# Patient Record
Sex: FEMALE | Race: BLACK OR AFRICAN AMERICAN | Employment: OTHER | URBAN - METROPOLITAN AREA
[De-identification: names, ages, dates, MRNs, and addresses within clinical notes are randomized per-mention and may not be internally consistent; named-entity substitution may affect disease eponyms.]

---

## 2018-07-02 ENCOUNTER — CLINICAL SUPPORT (OUTPATIENT)
Dept: SURGERY | Age: 68
End: 2018-07-02

## 2018-07-02 VITALS — WEIGHT: 293 LBS | BODY MASS INDEX: 45.99 KG/M2 | HEIGHT: 67 IN

## 2018-07-02 DIAGNOSIS — E66.01 MORBID OBESITY WITH BMI OF 50.0-59.9, ADULT (HCC): Primary | ICD-10-CM

## 2018-07-02 NOTE — PROGRESS NOTES
Medical Weight Loss Multi-Disciplinary Program    Name: Sharonda Arora   : 1950    Session# 1  Date: 2018     Height: 5' 7\" (170.2 cm)    Weight: (!) 161.9 kg (357 lb) lbs. Body mass index is 55.91 kg/(m^2). Pounds Lost: 0 Pounds Gained: 0    Dietary Instructions    Reviewed intake  Understanding label reading  Understanding low carbohydrates, low sugar, higher protein meals  Understanding proper portions  Dining outside home  Instruction given for personal dietary changes  Discussed perceived compliance  Comments: Pt given brief pre/post-op diet ed and diet hx reviewed. Physical Activity/Exercise    Discussed Perceived Compliance  Reasonable Goals Set  Motivation  Comments: patient walks 30-35 minutes 3 days a week      Behavior Modification    Positive attitude  Comments: Pt is working on the following goals:  Goals:   1. Continue current exercise routine of walking 3 days a week for 30-35 minutes, adding the elliptical machine slowly as able  2. Start taking a daily MVI ([place] Complete Chewable) once a day before surgery and twice a day after surgery for life  3. Make sure you're consistently eating three meals a day - you can use a protein shake as a meal replacement or snack (refer to protein supplement guide in nutrition folder) - you can use a protein shake as one meal replacement and one snack - can drink up to 2 shakes a day    Candidate for surgery (per RD):pending     Dietitian: Marlene Hurst is a 75 y.o. female who present for a pre-op evaluation. Visit Vitals    Ht 5' 7\" (1.702 m)    Wt (!) 161.9 kg (357 lb)    BMI 55.91 kg/m2     No past medical history on file. Procedure:  laparoscopic gastric bypass surgery     Reasons for Surgery:  BMI > 40 with one or more medically significant comorbidities    Summary:  Pt given brief pre/post-op diet ed and diet hx reviewed.  Pt instructed to follow a low calorie, low carbohydrate, high protein diet of about 1313-7813 calories daily. Pt set several goals. See below. Current Vitamins: none     What have you done in the past to try to lose weight? TOPS, weight watchers, slenderize (in [place]), calorie-counting, cabbage soup diet, tuna fish diet     Why didn't you lose weight or keep the weight off?: patient was able to lose inches with slenderize, and some weight with weight watchers, but was not able to keep the weight off     Why do you think having weight loss surgery will make it possible for you to lose weight and keep it off? Patient is here today because she is a diabetic and for the last few years she was placed on insulin and wants to lose the weight in order to stop taking her insulin and to feel better mentally and physically and to prevent anymore diabetes-related issues       Patient Education and Materials Provided:  Supplement Resource Guide, B Vitamin Information, MVI Recommendations, Calcium Citrate Information, Bariatric Supplement Companies, Protein Supplement Information, Fluid Requirements, No Caffeine or Carbonation, No Alcohol for One Year Post Op, 3 Balanced Meals a Day, Food Group Guide, Good Choices Dining Out, No Snacks, No Concentrated Sweets, Support System at Home, Exercising and Support Group Information    Nutritional Hx: What is the number of meals you eat per day? Sometimes 2 and sometimes 3   Comment: her kitchen is downstairs and sometimes she doesn't eat due to working on other things around the house     Do you eat between meals / snack? no  Typical snack: patient stopped snacking throughout the day, but recently stopped     How fast do you eat your meals? slow    How often do you eat fast food? occasionally    How many sodas/sugared beverages do you drink per day? No soda, no sweet tea     How many caffeinated drinks do you have per day? Patient drinks 2 cups of coffee a day     How much milk and/or juice do you drink per day?  No milk, no juice     How much water do you drink per day? Patient drinks 2-3 24-ounce cup fulls of water a day     How often do you consume alcohol? never;     Diet History:  Breakfast  What are you eating and how much? Bran flakes with blueberries with a boiled egg    When? ii   Where? iii   Snacks  What are you eating and how much? None    When? ii   Where? iii   Hydration  What are you eating and how much? 2 cups of coffee and water   When? ii   Where? ii   Lunch  What are you eating and how much? Skipped    When? ii   Where? iii   Snacks  What are you eating and how much? None    When? ii   Where? iii   Hydration  What are you eating and how much? water   When? ii   Where? iii   Dinner  What are you eating and how much? 1/2 roast beef sandwich    When? ii   Where? iii   Snacks  What are you eating and how much? sherbet   When? ii   Where? iii   Hydration  What are you eating and how much? water   When? ii   Where? iii     Exercise:  Do you currently have an exercise routine? yes  What kind of exercise do you do? patient walks . For how long? 30-35 minutes For how often? 3 days a week    Goals:   1. Continue current exercise routine of walking 3 days a week for 30-35 minutes, adding the elliptical machine slowly as able  2. Start taking a daily MVI ([place] Complete Chewable) once a day before surgery and twice a day after surgery for life  3.  Make sure you're consistently eating three meals a day - you can use a protein shake as a meal replacement or snack (refer to protein supplement guide in nutrition folder) - you can use a protein shake as one meal replacement and one snack - can drink up to 2 shakes a day

## 2018-07-02 NOTE — PATIENT INSTRUCTIONS
Goals: 1. Continue current exercise routine of walking 3 days a week for 30-35 minutes, adding the elliptical machine slowly as able  2. Start taking a daily MVI ([place]'s Complete Chewable) once a day before surgery and twice a day after surgery for life  3.  Make sure you're consistently eating three meals a day - you can use a protein shake as a meal replacement or snack (refer to protein supplement guide in nutrition folder) - you can use a protein shake as one meal replacement and one snack - can drink up to 2 shakes a day

## 2018-07-03 RX ORDER — FAMOTIDINE 20 MG/1
20 TABLET, FILM COATED ORAL
COMMUNITY
Start: 2017-07-18 | End: 2018-07-06

## 2018-07-06 ENCOUNTER — OFFICE VISIT (OUTPATIENT)
Dept: SURGERY | Age: 68
End: 2018-07-06

## 2018-07-06 VITALS
SYSTOLIC BLOOD PRESSURE: 147 MMHG | WEIGHT: 293 LBS | OXYGEN SATURATION: 99 % | TEMPERATURE: 97.6 F | RESPIRATION RATE: 16 BRPM | DIASTOLIC BLOOD PRESSURE: 56 MMHG | HEART RATE: 80 BPM | BODY MASS INDEX: 45.99 KG/M2 | HEIGHT: 67 IN

## 2018-07-06 DIAGNOSIS — E66.01 MORBID OBESITY WITH BODY MASS INDEX (BMI) OF 50.0 TO 59.9 IN ADULT (HCC): ICD-10-CM

## 2018-07-06 DIAGNOSIS — I10 ESSENTIAL HYPERTENSION: ICD-10-CM

## 2018-07-06 DIAGNOSIS — Z79.4 CONTROLLED TYPE 2 DIABETES MELLITUS WITHOUT COMPLICATION, WITH LONG-TERM CURRENT USE OF INSULIN (HCC): ICD-10-CM

## 2018-07-06 DIAGNOSIS — E78.00 HYPERCHOLESTEROLEMIA: ICD-10-CM

## 2018-07-06 DIAGNOSIS — E11.9 CONTROLLED TYPE 2 DIABETES MELLITUS WITHOUT COMPLICATION, WITH LONG-TERM CURRENT USE OF INSULIN (HCC): ICD-10-CM

## 2018-07-06 DIAGNOSIS — E66.01 MORBID OBESITY DUE TO EXCESS CALORIES (HCC): Primary | ICD-10-CM

## 2018-07-06 DIAGNOSIS — M17.0 PRIMARY OSTEOARTHRITIS OF BOTH KNEES: ICD-10-CM

## 2018-07-06 RX ORDER — ATORVASTATIN CALCIUM 20 MG/1
TABLET, FILM COATED ORAL DAILY
COMMUNITY

## 2018-07-06 RX ORDER — CARVEDILOL 25 MG/1
25 TABLET ORAL 2 TIMES DAILY WITH MEALS
COMMUNITY

## 2018-07-06 RX ORDER — INSULIN GLARGINE 100 [IU]/ML
40 INJECTION, SOLUTION SUBCUTANEOUS DAILY
COMMUNITY

## 2018-07-06 RX ORDER — ASPIRIN 81 MG/1
TABLET ORAL DAILY
COMMUNITY

## 2018-07-06 RX ORDER — TELMISARTAN AND HYDROCHLORTHIAZIDE 80; 25 MG/1; MG/1
1 TABLET ORAL DAILY
COMMUNITY

## 2018-07-06 NOTE — PROGRESS NOTES
Bariatric Surgery Consultation    Subjective: The patient is a 75 y.o. obese female with a Body mass index is 54.86 kg/(m^2). Hugh Crane The patient is at her heaviest weight for the past several years. she has been overweight since she can remember. she has been considering surgery since 1 month ago. she desires surgery at this time because of multiple health concerns and lifestyle issues which are hindered by their weight. she has been referred by his family physician Dr Bonita Hill and Dr Jasson Hernandez for evaluation and treatment of their obesity via surgical intervention. Joshua Reid has tried multiple diets in her lifetime most recently tried behavior modification, unsupervised diets, Weight Watchers, Amelia Smoke, Atkins and Nutrisystem, cabage diet, slenderize and tops    Bariatric comorbidities present are   Patient Active Problem List   Diagnosis Code    Morbid obesity with body mass index (BMI) of 50.0 to 59.9 in adult ([address] E66.01, Z68.43    Morbid obesity due to excess calories ([address] E66.01    Diabetes type 2, controlled ([address] E11.9    Hypercholesterolemia E78.00    HTN (hypertension) I10    Morbid obesity ([address] E66.01    Morbid obesity with BMI of 50.0-59.9, adult ([address] E66.01, Z68.43    Osteoarthritis of both knees M17.0       The patient is considering laparoscopic gastric bypass surgery for surgical weight loss due to their ineffective progress with medical forms of weight loss and the urging of their physician who cares for their primary medical issues. The patient  now presents  for consideration for weight loss surgery understanding the benefits of this over a medical approach of weight loss as was discussed in our presentation on weight loss surgery. They have discussed their plans both with their family and primary care physician who is in support of their pursuit of such.  The patient has always had health issues as of late and denies gastrointestinal disturbances other than what is outlined below in their review of symptoms. All of their prior evaluations available by both their PCP's and specialists physicians have been reviewed today either in the Care Everywhere portal or scanned under the media tab. I have spent a large portion of my initial consultation today reviewing the patients current dietary habits which have contributed to their health issues and obesity. I have suggested to them a dietary regimen that they can initiate now to help with their status as it pertains to their weight. They understand that the most important aspect of their journey through their weight loss endeavor will be their adherence to a new lifestyle of healthy eating behavior. They also understand that adherence to an exercise program will not only help with weight loss but is ultimately important in weight maintenance. The patients goal weight is 190lb. These goals are consistent with expected outcomes of their desired operation. her Medical goals are to resolve these health issues.       Patient Active Problem List    Diagnosis Date Noted    Morbid obesity with body mass index (BMI) of 50.0 to 59.9 in adult [facility]) 07/06/2018    Morbid obesity due to excess calories ([address] 07/06/2018    Diabetes type 2, controlled ([address]     Hypercholesterolemia     HTN (hypertension)     Morbid obesity ([address]     Morbid obesity with BMI of 50.0-59.9, adult [facility]     Osteoarthritis of both knees       Past Surgical History:   Procedure Laterality Date    HX BREAST BIOPSY      on right breast    HX COLONOSCOPY      HX LAP CHOLECYSTECTOMY      2006    HX TUBAL LIGATION      HX WISDOM TEETH EXTRACTION        Social History   Substance Use Topics    Smoking status: Former Smoker     Packs/day: 0.25     Years: 8.00     Types: Cigarettes     Quit date: 7/6/1975    Smokeless tobacco: Never Used    Alcohol use No      Family History   Problem Relation Age of Onset    Hypertension Mother     Emphysema Father  Asthma Father       Current Outpatient Prescriptions   Medication Sig Dispense Refill    SITagliptin-metFORMIN (JANUMET) 50-1,000 mg per tablet Take 1 Tab by mouth two (2) times daily (with meals).  carvedilol (COREG) 25 mg tablet Take 25 mg by mouth two (2) times daily (with meals).  insulin glargine (LANTUS SOLOSTAR U-100 INSULIN) 100 unit/mL (3 mL) inpn 40 Units by SubCUTAneous route daily.  aspirin delayed-release 81 mg tablet Take  by mouth daily.  telmisartan-hydroCHLOROthiazide (MICARDIS HCT) 80-25 mg per tablet Take 1 Tab by mouth daily.  atorvastatin (LIPITOR) 20 mg tablet Take  by mouth daily. Allergies   Allergen Reactions    Poison Ivy Extract Swelling    Robitussin [Guaifenesin] Hives and Swelling          Review of Systems:            General - No history or complaints of unexpected fever, chills, or weight loss  Head/Neck - No history or complaints of headache, diplopia, dysphagia, hearing loss  Cardiac - History of heart murmur. No history or complaints of chest pain, palpitations, or shortness of breath  Pulmonary - No history or complaints of shortness of breath, productive cough, hemoptysis  Gastrointestinal - History of acid reflux noted which is resolved, no abdominal pain, obstipation/constipation or blood per rectum  Genitourinary - No history or complaints of hematuria/dysuria, stress urinary incontinence symptoms, or renal lithiasis  Musculoskeletal - History of joint pain in their bilateral knees,  no muscular weakness  Hematologic - No history or complaints of bleeding disorders,  No blood transfusions  Neurologic - No history or complaints of  migraine headaches, seizure activity, syncopal episodes, TIA or stroke  Integumentary - History of rash.   No history or complaints of abnormal nevi, skin cancer  Gynecological - No history of cervical cancer or dysmenorrhea           Objective:     Visit Vitals    /56 (BP 1 Location: Left arm, BP Patient Position: Sitting)    Pulse 80    Temp 97.6 °F (36.4 °C)    Resp 16    Ht 5' 7\" (1.702 m)    Wt (!) 158.9 kg (350 lb 4.8 oz)    SpO2 99%    BMI 54.86 kg/m2       Physical Examination: General appearance - alert, well appearing, and in no distress and oriented to person, place, and time  Mental status - alert, oriented to person, place, and time, normal mood, behavior, speech, dress, motor activity, and thought processes  Eyes - pupils equal and reactive, extraocular eye movements intact, sclera anicteric, left eye normal, right eye normal  Ears - bilateral TM's and external ear canals normal, right ear normal, left ear normal  Nose - normal and patent, no erythema, discharge or polyps  Mouth - mucous membranes moist, pharynx normal without lesions  Neck - supple, no significant adenopathy  Lymphatics - no palpable lymphadenopathy, no hepatosplenomegaly  Chest - clear to auscultation, no wheezes, rales or rhonchi, symmetric air entry  Heart - normal rate, regular rhythm, normal S1, S2, no murmurs, rubs, clicks or gallops  Abdomen - soft, nontender, nondistended, no masses or organomegaly  Back exam - full range of motion, no tenderness, palpable spasm or pain on motion  Neurological - alert, oriented, normal speech, no focal findings or movement disorder noted  Musculoskeletal - no joint tenderness, deformity or swelling  Extremities - peripheral pulses normal, no pedal edema, no clubbing or cyanosis    Labs:     No results found for: WBC, WBCLT, HGBPOC, HGB, HGBP, HCTPOC, HCT, PHCT, RBCH, PLT, MCV, HGBEXT, HCTEXT, PLTEXT, HGBEXT, HCTEXT, PLTEXT  No results found for: NA, K, CL, CO2, AGAP, GLU, BUN, CREA, BUCR, GFRAA, GFRNA, CA, TBIL, TBILI, GPT, SGOT, AP, TP, ALB, GLOB, AGRAT, ALT  No results found for: IRON, FE, TIBC, IBCT, PSAT, FERR  No results found for: FOL, RBCF  No results found for: VITD3, XQVID2, XQVID3, XQVID, VD3RIA              Assessment:     Morbid obesity with associated comorbidity    Plan: laparoscopic gastric bypass surgery    This is a 75 y.o. female with a BMI of Body mass index is 54.86 kg/(m^2). and the weight-related co-morbidties of HTN and DM type II. Marin Montero meets the NIH criteria for bariatric surgery based upon the BMI of Body mass index is 54.86 kg/(m^2). and multiple weight-related co-morbidties. Marin Montero has elected laparoscopic gastric bypass as her intervention of choice for treatment of morbid obestiy through surgical means secondary to its uniform results,  profound baseline suppression of hunger and pace at which weight is lost.    In the office today, following Gemini's history and physical examination, a 30 minute discussion regarding the anatomic alterations for the laparoscopic gastric bypass  was undertaken. The dietary expectations and the patient  dependent factors for success were thoroughly discussed, to include the need for interval follow-up and long-term dietary changes associated with success. The possible short and long term  complications of the gastric bypass were also discussed, to include but not limited to;death, DVT/PE, staple line leak, bleeding, stricture formation, infection, internal hernia  and pouch dilation. Specific weight related outcomes for success were also discussed with an emphasis on careful and close follow-up with the first year and dietary behavior modification over the first years as baseline cyclical hunger returns  The patient expressed an understanding of the above factors, and her questions were answered in their entirety. In addition, the patient attended a 1.5 hour power point seminar regarding obesity, surgical weight loss including, adjustable gastric band, gastric bypass, and sleeve gastrectomy. This discussion contrasted the different surgical techniques, mechanisms of actions and expected outcomes, and surgical and medical risks associated with each procedure.   During this seminar, there was a long question and answer session where all questions were answered until there were no additional questions. Today, the patient had all of her questions answered and desires to proceed with  bariatric surgery initially choosing the gastric bypass as her surgical option. Secondary Diagnoses:     Dietary Intervention  - The patient is currently scheduled to see or has been followed by a bariatric nutritionist for an attempt at preoperative weight loss as has been dictated by their insurance carrier. They will be assessed at various times during their follow up to evaluate their progress depending on the length of time that is required once again by their carrier. I have explained the importance of preoperative weight loss and the benefits regarding lower surgical risk and also assisting the patient in reaching their weight loss goal.  Finally they understand their is a physiologic benefit from the standpoint of hepatic volume reduction preoperatively. I have reiterated the importance of a low carbohydrate and high protein regimen to achieve their stated goal. Weight loss goal is 20 pounds. Adult Onset Diabetes - The patient has amrita given a very low carbohydrate diet preoperatively along with instructions to monitor their blood sugars on a regular daily basis. When  their surgery is performed  we will be monitoring the patient with sliding scale insulin and accuchecks.  Based on those values we will determine whether the patient needs a reduction of those medications postoperatively or total removal of those medications on discharge.  We will have the patient continue accuchecks postoperatively while at home also and report to me or their family physician for appropriate adjustments as needed.  The patient also understands that in the event of uncontrolled blood sugar preoperatively that we may choose to postpone their surgery.     Hypertension - The patient has a clear understanding of how weight loss improves hypertension as a whole, but also they understand that there is a significant genetic component to this disease process. We will monitor the patients blood pressure while in the hospital and the plan would be to continue those medications postoperatively.  If a diuretic is being used we will stop them on discharge to prevent dehydration particularly with the sleeve gastrectomy and the gastric bypass procedures.  They will be instructed to monitor their blood pressure postoperatively while at home and notify their primary care physician in the event of any significantly high or uncharacteristic readings. Hyperlipidemia - The patient understands that studies show that almost all patient will realize an improvement in their lipid profile with weight loss that occurs with these procedures. They however also understand that hyperlipidemia is a multifactorial disease particularly as it pertains to their genetic background and that there is no guarantee toward cure  of this issue. We will resume their medications immediately postoperatively as this tends to decrease any post operative cardiac events.  The patient will follow up with their family physician in the postoperative period with plans to repeat their lipid panel 2-3 month postoperative for potential adjustment or removal of these medications.     Weight Related Arthritis -The patient understands the benefits that weight loss surgery can have on their arthritis but also understands that weight loss is not a guaranteed cure and relief of symptoms is often dependent on the severity of the underlying disease.  The patient also understands that traditional pharmaceutical treatments for this diagnosis are usually unavailable to post-operative weight loss patients due to the effects on the gastrointestinal tract particularly with the gastric bypass and to a lesser effect with the sleeve gastrectomy.  Any changes to the patients medication treatment will ultimately be made the patients PCP with input by our office. Coronary Artery Disease - The patient has undergone or will undergo a preoperative evaluation by a cardiologist such that they are deemed a reasonable candidate for surgery. The patient understands that with a history of cardiac disease that there is always an increased risk compared to the average patient. Appropriate recommendations have been followed as recommended by the cardiologist.  The patients ASA will be resumed approximately 1 month postoperatively in a coated form. Will send to Dr Tyesha Kemp for evaluation.       Signed By: Katlyn Higgins MD     July 6, 2018

## 2018-07-06 NOTE — PATIENT INSTRUCTIONS
Body Mass Index: Care Instructions  Your Care Instructions    Body mass index (BMI) can help you see if your weight is raising your risk for health problems. It uses a formula to compare how much you weigh with how tall you are. · A BMI lower than 18.5 is considered underweight. · A BMI between 18.5 and 24.9 is considered healthy. · A BMI between 25 and 29.9 is considered overweight. A BMI of 30 or higher is considered obese. If your BMI is in the normal range, it means that you have a lower risk for weight-related health problems. If your BMI is in the overweight or obese range, you may be at increased risk for weight-related health problems, such as high blood pressure, heart disease, stroke, arthritis or joint pain, and diabetes. If your BMI is in the underweight range, you may be at increased risk for health problems such as fatigue, lower protection (immunity) against illness, muscle loss, bone loss, hair loss, and hormone problems. BMI is just one measure of your risk for weight-related health problems. You may be at higher risk for health problems if you are not active, you eat an unhealthy diet, or you drink too much alcohol or use tobacco products. Follow-up care is a key part of your treatment and safety. Be sure to make and go to all appointments, and call your doctor if you are having problems. It's also a good idea to know your test results and keep a list of the medicines you take. How can you care for yourself at home? · Practice healthy eating habits. This includes eating plenty of fruits, vegetables, whole grains, lean protein, and low-fat dairy. · If your doctor recommends it, get more exercise. Walking is a good choice. Bit by bit, increase the amount you walk every day. Try for at least 30 minutes on most days of the week. · Do not smoke. Smoking can increase your risk for health problems. If you need help quitting, talk to your doctor about stop-smoking programs and medicines. These can increase your chances of quitting for good. · Limit alcohol to 2 drinks a day for men and 1 drink a day for women. Too much alcohol can cause health problems. If you have a BMI higher than 25  · Your doctor may do other tests to check your risk for weight-related health problems. This may include measuring the distance around your waist. A waist measurement of more than 40 inches in men or 35 inches in women can increase the risk of weight-related health problems. · Talk with your doctor about steps you can take to stay healthy or improve your health. You may need to make lifestyle changes to lose weight and stay healthy, such as changing your diet and getting regular exercise. If you have a BMI lower than 18.5  · Your doctor may do other tests to check your risk for health problems. · Talk with your doctor about steps you can take to stay healthy or improve your health. You may need to make lifestyle changes to gain or maintain weight and stay healthy, such as getting more healthy foods in your diet and doing exercises to build muscle. Where can you learn more? Go to http://javi-amanda.info/. Enter S176 in the search box to learn more about \"Body Mass Index: Care Instructions. \"  Current as of: October 13, 2016  Content Version: 11.4  © 8095-9733 Healthwise, Incorporated. Care instructions adapted under license by [employer] (which disclaims liability or warranty for this information). If you have questions about a medical condition or this instruction, always ask your healthcare professional. [place] [address] any warranty or liability for your use of this information. New patient Instructions      1. Ensure all pre-operative insurances requirements are complete (ie; dietary visits, psychology consults, primary care documentation, etc)    2. Adhere to pre-operative weight loss / weight maintenance plan discussed in the office today.     3. Contact the office with any questions on pre-operative clearance issues (ie; cardiology work-up, pulmonary work-up, upper GI study, etc). 4. If a barium upper GI study has been ordered for your evaluation, make sure you are on liquids only the morning of the procedure.

## 2018-07-06 NOTE — MR AVS SNAPSHOT
[facility] 
 
 
 One [facility] 
[phone] Patient: Caitlyn Colon MRN: [id number] EVX:[birth year] Visit Information Date & Time Provider Department Dept. Phone Encounter #  
 7/6/2018 11:00 AM [facility] [id number] Follow-up Instructions Return in about 2 months (around 9/6/2018). Follow-up and Disposition History Your Appointments 8/1/2018  7:30 AM  
NUTRITION COUNSELING with TSS NUTR VISIT2 [facility] ([address]) Appt Note: 2:4  
 One [address] [place] [address]  
  
   
 [address] [place] [address] Upcoming Health Maintenance Date Due Hepatitis C Screening 1950 DTaP/Tdap/Td series (1 - Tdap) 8/30/1970 FOBT Q 1 YEAR AGE 50-75 8/30/1999 ZOSTER VACCINE AGE 60> 6/30/2009 BREAST CANCER SCRN MAMMOGRAM 12/21/2013 GLAUCOMA SCREENING Q2Y 8/30/2014 Bone Densitometry (Dexa) Screening 8/30/2014 Pneumococcal 65+ Low/Medium Risk (1 of 2 - PCV13) 8/30/2014 MEDICARE YEARLY EXAM 7/2/2018 Influenza Age 5 to Adult 8/1/2018 Allergies as of 7/6/2018  Review Complete On: 7/6/2018 By: Sussy Richter MD  
  
 Severity Noted Reaction Type Reactions Poison Ivy Extract High 07/06/2018    Swelling Robitussin [Guaifenesin]  07/06/2018   Systemic Hives, Swelling Current Immunizations  Never Reviewed No immunizations on file. Not reviewed this visit You Were Diagnosed With   
  
 Codes Comments Morbid obesity due to excess calories ([facility] [address]    -  Primary ICD-10-CM: E66.01 
ICD-9-CM: 278.01  Morbid obesity with body mass index (BMI) of 50.0 to 59.9 in adult [facility])     ICD-10-CM: E66.01, Z68.43 
ICD-9-CM: 278.01, V85.43   
 Controlled type 2 diabetes mellitus without complication, with long-term current use of insulin (HCC)     ICD-10-CM: E11.9, Z79.4 ICD-9-CM: 250.00, V58.67 Hypercholesterolemia     ICD-10-CM: E78.00 ICD-9-CM: 272.0 Essential hypertension     ICD-10-CM: I10 
ICD-9-CM: 401.9 Primary osteoarthritis of both knees     ICD-10-CM: M17.0 ICD-9-CM: 715.16 Vitals BP Pulse Temp Resp Height(growth percentile) Weight(growth percentile) 147/56 (BP 1 Location: Left arm, BP Patient Position: Sitting) 80 97.6 °F (36.4 °C) 16 5' 7\" (1.702 m) (!) 350 lb 4.8 oz (158.9 kg) SpO2 BMI Smoking Status 99% 54.86 kg/m2 Former Smoker Vitals History BMI and BSA Data Body Mass Index Body Surface Area 54.86 kg/m 2 2.74 m 2 Your Updated Medication List  
  
   
This list is accurate as of 7/6/18 12:20 PM.  Always use your most recent med list.  
  
  
  
  
 aspirin delayed-release 81 mg tablet Take  by mouth daily. COREG 25 mg tablet Generic drug:  carvedilol Take 25 mg by mouth two (2) times daily (with meals). JANUMET 50-1,000 mg per tablet Generic drug:  SITagliptin-metFORMIN Take 1 Tab by mouth two (2) times daily (with meals). LANTUS SOLOSTAR U-100 INSULIN 100 unit/mL (3 mL) Inpn Generic drug:  insulin glargine 40 Units by SubCUTAneous route daily. LIPITOR 20 mg tablet Generic drug:  atorvastatin Take  by mouth daily. MICARDIS HCT 80-25 mg per tablet Generic drug:  telmisartan-hydroCHLOROthiazide Take 1 Tab by mouth daily. Follow-up Instructions Return in about 2 months (around 9/6/2018). Patient Instructions Body Mass Index: Care Instructions Your Care Instructions Body mass index (BMI) can help you see if your weight is raising your risk for health problems. It uses a formula to compare how much you weigh with how tall you are. · A BMI lower than 18.5 is considered underweight. · A BMI between 18.5 and 24.9 is considered healthy. · A BMI between 25 and 29.9 is considered overweight. A BMI of 30 or higher is considered obese. If your BMI is in the normal range, it means that you have a lower risk for weight-related health problems. If your BMI is in the overweight or obese range, you may be at increased risk for weight-related health problems, such as high blood pressure, heart disease, stroke, arthritis or joint pain, and diabetes. If your BMI is in the underweight range, you may be at increased risk for health problems such as fatigue, lower protection (immunity) against illness, muscle loss, bone loss, hair loss, and hormone problems. BMI is just one measure of your risk for weight-related health problems. You may be at higher risk for health problems if you are not active, you eat an unhealthy diet, or you drink too much alcohol or use tobacco products. Follow-up care is a key part of your treatment and safety. Be sure to make and go to all appointments, and call your doctor if you are having problems. It's also a good idea to know your test results and keep a list of the medicines you take. How can you care for yourself at home? · Practice healthy eating habits. This includes eating plenty of fruits, vegetables, whole grains, lean protein, and low-fat dairy. · If your doctor recommends it, get more exercise. Walking is a good choice. Bit by bit, increase the amount you walk every day. Try for at least 30 minutes on most days of the week. · Do not smoke. Smoking can increase your risk for health problems. If you need help quitting, talk to your doctor about stop-smoking programs and medicines. These can increase your chances of quitting for good. · Limit alcohol to 2 drinks a day for men and 1 drink a day for women. Too much alcohol can cause health problems. If you have a BMI higher than 25 · Your doctor may do other tests to check your risk for weight-related health problems. This may include measuring the distance around your waist. A waist measurement of more than 40 inches in men or 35 inches in women can increase the risk of weight-related health problems. · Talk with your doctor about steps you can take to stay healthy or improve your health. You may need to make lifestyle changes to lose weight and stay healthy, such as changing your diet and getting regular exercise. If you have a BMI lower than 18.5 · Your doctor may do other tests to check your risk for health problems. · Talk with your doctor about steps you can take to stay healthy or improve your health. You may need to make lifestyle changes to gain or maintain weight and stay healthy, such as getting more healthy foods in your diet and doing exercises to build muscle. Where can you learn more? Go to http://javi-amanda.info/. Enter S176 in the search box to learn more about \"Body Mass Index: Care Instructions. \" Current as of: October 13, 2016 Content Version: 11.4 © 4162-3611 [employer]. Care instructions adapted under license by [employer] (which disclaims liability or warranty for this information). If you have questions about a medical condition or this instruction, always ask your healthcare professional. [place] [address] any warranty or liability for your use of this information. New patient Instructions 1. Ensure all pre-operative insurances requirements are complete (ie; dietary visits, psychology consults, primary care documentation, etc) 2. Adhere to pre-operative weight loss / weight maintenance plan discussed in the office today. 3. Contact the office with any questions on pre-operative clearance issues (ie; cardiology work-up, pulmonary work-up, upper GI study, etc). 4. If a barium upper GI study has been ordered for your evaluation, make sure you are on liquids only the morning of the procedure. Patient Instructions History Introducing [facility] & HEALTH SERVICES! Deena Moody introduces hCentive patient portal. Now you can access parts of your medical record, email your doctor's office, and request medication refills online. 1. In your internet browser, go to [email or web address] 2. Click on the First Time User? Click Here link in the Sign In box. You will see the New Member Sign Up page. 3. Enter your hCentive Access Code exactly as it appears below. You will not need to use this code after youve completed the sign-up process. If you do not sign up before the expiration date, you must request a new code. · hCentive Access Code: [email or web address] Expires: 9/30/2018  8:08 AM 
 
4. Enter the last four digits of your Social Security Number (xxxx) and Date of Birth (mm/dd/yyyy) as indicated and click Submit. You will be taken to the next sign-up page. 5. Create a hCentive ID. This will be your hCentive login ID and cannot be changed, so think of one that is secure and easy to remember. 6. Create a hCentive password. You can change your password at any time. 7. Enter your Password Reset Question and Answer. This can be used at a later time if you forget your password. 8. Enter your e-mail address. You will receive e-mail notification when new information is available in [address] 9. Click Sign Up. You can now view and download portions of your medical record. 10. Click the Download Summary menu link to download a portable copy of your medical information. If you have questions, please visit the Frequently Asked Questions section of the hCentive website. Remember, hCentive is NOT to be used for urgent needs. For medical emergencies, dial 911. Now available from your iPhone and Android! Please provide this summary of care documentation to your next provider. If you have any questions after today's visit, please call [phone].

## 2018-07-11 ENCOUNTER — APPOINTMENT (OUTPATIENT)
Dept: GENERAL RADIOLOGY | Age: 68
End: 2018-07-11
Attending: SPECIALIST
Payer: MEDICARE

## 2018-07-11 ENCOUNTER — HOSPITAL ENCOUNTER (OUTPATIENT)
Age: 68
Setting detail: OUTPATIENT SURGERY
Discharge: HOME | End: 2018-07-11
Attending: SPECIALIST | Admitting: SPECIALIST
Payer: MEDICARE

## 2018-07-11 VITALS
HEART RATE: 66 BPM | DIASTOLIC BLOOD PRESSURE: 80 MMHG | TEMPERATURE: 97.8 F | WEIGHT: 293 LBS | HEIGHT: 67 IN | SYSTOLIC BLOOD PRESSURE: 168 MMHG | RESPIRATION RATE: 18 BRPM | BODY MASS INDEX: 45.99 KG/M2 | OXYGEN SATURATION: 97 %

## 2018-07-11 DIAGNOSIS — E66.01 MORBID OBESITY (HCC): ICD-10-CM

## 2018-07-11 PROCEDURE — 74011000255 HC RX REV CODE- 255: Performed by: SPECIALIST

## 2018-07-11 PROCEDURE — 43999 UNLISTED PROCEDURE STOMACH: CPT | Performed by: SPECIALIST

## 2018-07-11 PROCEDURE — 74240 X-RAY XM UPR GI TRC 1CNTRST: CPT

## 2018-07-11 NOTE — IP AVS SNAPSHOT
[place] 
 
 
 [address] [facility] [address] 
[phone] Patient: Radha Felipe MRN: [id number] VJE:4/84/9818 A check claudia indicates which time of day the medication should be taken. My Medications ASK your doctor about these medications Instructions Each Dose to Equal  
 Morning Noon Evening Bedtime  
 aspirin delayed-release 81 mg tablet Your last dose was: Your next dose is: Take  by mouth daily. COREG 25 mg tablet Generic drug:  carvedilol Your last dose was: Your next dose is: Take 25 mg by mouth two (2) times daily (with meals). 25 mg JANUMET 50-1,000 mg per tablet Generic drug:  SITagliptin-metFORMIN Your last dose was: Your next dose is: Take 1 Tab by mouth two (2) times daily (with meals). 1 Tab LANTUS SOLOSTAR U-100 INSULIN 100 unit/mL (3 mL) Inpn Generic drug:  insulin glargine Your last dose was: Your next dose is:    
   
   
 40 Units by SubCUTAneous route daily. 40 Units LIPITOR 20 mg tablet Generic drug:  atorvastatin Your last dose was: Your next dose is: Take  by mouth daily. MICARDIS HCT 80-25 mg per tablet Generic drug:  telmisartan-hydroCHLOROthiazide Your last dose was: Your next dose is: Take 1 Tab by mouth daily. 1 Tab

## 2018-07-11 NOTE — PROCEDURES
Patient:Gemini Nichols   : 1950  Medical Record Number:[id number]            PREPROCEDURE DIAGNOSIS: This patient is preoperative for laparoscopic gastric bypass surgeryprocedure with a history of  reflux disease. POSTPROCEDURE DIAGNOSIS: This patient is preoperative for laparoscopic gastric bypass surgeryprocedure with a history of  reflux disease. PROCEDURES PERFORMED: Upper GI study with barium. ESTIMATED BLOOD LOSS: None. SPECIMENS: None. STATEMENT OF MEDICAL NECESSITY: The patient is a patient with a  longstanding history of obesity. They are now considering the laparoscopic gastric bypass surgeryprocedure as a means of surgical weight control and due to their history of reflux disease and are being assessed preoperatively for such. DESCRIPTION OF PROCEDURE: The patient was brought to the fluoroscopy unit and  was given thin barium. On swallowing of barium, they were noted to have  normal peristalsis of their esophagus. They had prompt filling of distal  esophagus with tapering into the gastroesophageal junction. There was evidence of a hiatal hernia present. Contrast then filled the gastric cardia, fundus,body and pre pyloric region with no abnormalities noted. Contrast then exited the pylorus in normal fashion. No obstruction was noted. There wasno evidence of reflux noted.     (small hiatal hernia)    Jacquelyn Kimbrough MD

## 2018-07-11 NOTE — IP AVS SNAPSHOT
[address] [facility] 
 
 
 [address] [address] 
[phone] Patient: Alondra Burciaga MRN: [id number] DENAE:6/[birth year]/9632 About your hospitalization You were admitted on:  July 11, 2018 You last received care in the:  THE [facility] ENDOSCOPY You were discharged on:  July 11, 2018 Why you were hospitalized Your primary diagnosis was:  Not on File Follow-up Information None Your Scheduled Appointments Wednesday July 11, 2018 GASTRIC BAND ADJUSTMENT with Marianna Owen MD  
THE [facility] ENDOSCOPY [facility] [address] [address]  
[phone] Wednesday August 01, 2018  7:30 AM EDT NUTRITION COUNSELING with TSS NUTR VISIT2 [facility] ([address])  
 [place] [address]  
[phone] Discharge Orders None A check claudia indicates which time of day the medication should be taken. My Medications ASK your doctor about these medications Instructions Each Dose to Equal  
 Morning Noon Evening Bedtime  
 aspirin delayed-release 81 mg tablet Your last dose was: Your next dose is: Take  by mouth daily. COREG 25 mg tablet Generic drug:  carvedilol Your last dose was: Your next dose is: Take 25 mg by mouth two (2) times daily (with meals). 25 mg JANUMET 50-1,000 mg per tablet Generic drug:  SITagliptin-metFORMIN Your last dose was: Your next dose is: Take 1 Tab by mouth two (2) times daily (with meals). 1 Tab LANTUS SOLOSTAR U-100 INSULIN 100 unit/mL (3 mL) Inpn Generic drug:  insulin glargine Your last dose was: Your next dose is:    
   
   
 40 Units by SubCUTAneous route daily. 40 Units LIPITOR 20 mg tablet Generic drug:  atorvastatin Your last dose was: Your next dose is: Take  by mouth daily. MICARDIS HCT 80-25 mg per tablet Generic drug:  telmisartan-hydroCHLOROthiazide Your last dose was: Your next dose is: Take 1 Tab by mouth daily. 1 Tab Discharge Instructions None Introducing [facility] & HEALTH SERVICES! Leo Lackey introduces [employer] patient portal. Now you can access parts of your medical record, email your doctor's office, and request medication refills online. 1. In your internet browser, go to [email or web address] 2. Click on the First Time User? Click Here link in the Sign In box. You will see the New Member Sign Up page. 3. Enter your [employer] Access Code exactly as it appears below. You will not need to use this code after youve completed the sign-up process. If you do not sign up before the expiration date, you must request a new code. · [employer] Access Code: [email or web address] Expires: 9/30/2018  8:08 AM 
 
4. Enter the last four digits of your Social Security Number (xxxx) and Date of Birth (mm/dd/yyyy) as indicated and click Submit. You will be taken to the next sign-up page. 5. Create a [employer] ID. This will be your [employer] login ID and cannot be changed, so think of one that is secure and easy to remember. 6. Create a [employer] password. You can change your password at any time. 7. Enter your Password Reset Question and Answer. This can be used at a later time if you forget your password. 8. Enter your e-mail address. You will receive e-mail notification when new information is available in [address] 9. Click Sign Up. You can now view and download portions of your medical record. 10. Click the Download Summary menu link to download a portable copy of your medical information. If you have questions, please visit the Frequently Asked Questions section of the MyChart website. Remember, [employer] is NOT to be used for urgent needs. For medical emergencies, dial 911. Now available from your iPhone and Android! Introducing Bernard Rosenthal As a [facility] patient, I wanted to make you aware of our electronic visit tool called Bernard Rosenthal. RxResultszachariah LAVEGO 24/7 allows you to connect within minutes with a medical provider 24 hours a day, seven days a week via a mobile device or tablet or logging into a secure website from your computer. You can access Bernard Rosenthal from anywhere in the [place]. A virtual visit might be right for you when you have a simple condition and feel like you just dont want to get out of bed, or cant get away from work for an appointment, when your regular [facility] provider is not available (evenings, weekends or holidays), or when youre out of town and need minor care. Electronic visits cost only $49 and if the [facility] 24/7 provider determines a prescription is needed to treat your condition, one can be electronically transmitted to a nearby pharmacy*. Please take a moment to enroll today if you have not already done so. The enrollment process is free and takes just a few minutes. To enroll, please download the [employer] elicia to your tablet or phone, or visit [email or web address] to enroll on your computer. And, as an [address] [place] [address] patient with a [employer] account, the results of your visits will be scanned into your electronic medical record and your primary care provider will be able to view the scanned results. We urge you to continue to see your regular [facility] provider for your ongoing medical care.   And while your primary care provider may not be the one available when you seek a Bernard Rosenthal virtual visit, the peace of mind you get from getting a real diagnosis real time can be priceless. For more information on Bernard Rosenthal, view our Frequently Asked Questions (FAQs) at [email or web address]. Sincerely, 
 
Neris Bill MD 
Chief Medical Officer [employer] *:  certain medications cannot be prescribed via Bernard Rosenthal Unresulted Labs-Please follow up with your PCP about these lab tests Order Current Status XR GASTROGRAFFIN UPPER GI In process Providers Seen During Your Hospitalization Provider Specialty Primary office phone Marianna Owen MD General Surgery [phone] Your Primary Care Physician (PCP) Primary Care Physician Office Phone Office Fax [facility], [address] [phone] You are allergic to the following Allergen Reactions Poison Ivy Extract Swelling Robitussin (Guaifenesin) Hives Swelling Recent Documentation Height Weight BMI Smoking Status 1.702 m 157.4 kg 54.35 kg/m2 Former Smoker Emergency Contacts Name Discharge Info Relation Home Work Mobile Yordan Reid DISCHARGE CAREGIVER [3] Spouse [3] [phone] Patient Belongings The following personal items are in your possession at time of discharge: 
                             
 
  
  
 Please provide this summary of care documentation to your next provider. Signatures-by signing, you are acknowledging that this After Visit Summary has been reviewed with you and you have received a copy. Patient Signature:  ____________________________________________________________ Date:  ____________________________________________________________  
  
Sabiha Connolly Provider Signature:  ____________________________________________________________ Date:  ____________________________________________________________

## 2018-08-01 ENCOUNTER — CLINICAL SUPPORT (OUTPATIENT)
Dept: SURGERY | Age: 68
End: 2018-08-01

## 2018-08-01 VITALS — HEIGHT: 67 IN | BODY MASS INDEX: 45.99 KG/M2 | WEIGHT: 293 LBS

## 2018-08-01 DIAGNOSIS — E66.01 MORBID OBESITY WITH BODY MASS INDEX (BMI) OF 50.0 TO 59.9 IN ADULT (HCC): Primary | ICD-10-CM

## 2018-08-01 NOTE — PROGRESS NOTES
Medical Weight Loss Multi-Disciplinary Program    Name: Yaima Gorman   : 1950    Session# 2  Date: 2018     Height: 5' 7\" (170.2 cm)    Weight: 158.8 kg (350 lb) lbs. Body mass index is 54.82 kg/(m^2). Pounds Lost: 0 Pounds Gained: 0    Patient has a 20# weight loss goal from 350#     Dietary Instructions    Reviewed intake  Instruction given for personal dietary changes  Discussed perceived compliance  Comments: reviewed patient's past monthly diet hx. Patient has been working on drinking 64 ounces of non-caloric fluid a day. She's also been working on consistently eating 3 meals a day using a protein shake. States it's been a struggled to actually eat the three meals - patient states she isn't really hungry, but is using her protein shake as a meal replacement and snack (peaches and cream). Physical Activity/Exercise    Reviewed Activity Log  Discussed Perceived Compliance  Reasonable Goals Set  Motivation  Comments: patient has been walking 3 times a week for 30-35 minutes and adding in the elliptical in slowly - patient is in a lot of pain this month (her back and her legs) so she's been having to break her 30-35 minutes up into smaller intervals     Behavior Modification    Reviewed behavior modification log  Identify obstacles to trigger change  Achieving/Rewarding goals met  Positive attitude  Discussed perceived compliance  Comments:     Goals:  1. Continue working on drinking 64 ounces of non-caloric fluid a day - working on drinking most of it earlier in the day  2. Continue current exercise routine of walking 3 days a week for 30-35 minutes - breaking it into smaller intervals as your back and legs allow you to get the total minutes   3.  Continue using your protein shake as a meal replacement or snack (remember your protein shake counts towards your 64 ounces of total non-caloric fluid a day) - you can drink 3.5 bottles of water plus your protein shake to get to your 64 ounces of non-caloric fluid         Candidate for surgery (per RD): pending     Dietitian: Radha Jacob

## 2018-08-01 NOTE — PATIENT INSTRUCTIONS
Goals: 1. Continue working on drinking 64 ounces of non-caloric fluid a day - working on drinking most of it earlier in the day  2. Continue current exercise routine of walking 3 days a week for 30-35 minutes - breaking it into smaller intervals as your back and legs allow you to get the total minutes   3.  Continue using your protein shake as a meal replacement or snack (remember your protein shake counts towards your 64 ounces of total non-caloric fluid a day) - you can drink 3.5 bottles of water plus your protein shake to get to your 64 ounces of non-caloric fluid

## 2018-09-05 ENCOUNTER — CLINICAL SUPPORT (OUTPATIENT)
Dept: SURGERY | Age: 68
End: 2018-09-05

## 2018-09-05 VITALS — HEIGHT: 67 IN | WEIGHT: 293 LBS | BODY MASS INDEX: 45.99 KG/M2

## 2018-09-05 DIAGNOSIS — E66.01 MORBID OBESITY WITH BODY MASS INDEX (BMI) OF 50.0 TO 59.9 IN ADULT (HCC): Primary | ICD-10-CM

## 2018-09-05 NOTE — PROGRESS NOTES
Medical Weight Loss Multi-Disciplinary Program    Name: Kong Miller   : 1950    Session# 3  Date: 2018     Height: 5' 7\" (170.2 cm)    Weight: 157.9 kg (348 lb) lbs. Body mass index is 54.5 kg/(m^2). Pounds Lost: 2     Patient has a 20# weight loss goal from 350#    Dietary Instructions    Reviewed intake  Instruction given for personal dietary changes  Discussed perceived compliance  Comments: reviewed patient's past monthly diet hx. Patient states she is doing well drinking her 64 ounces of non-caloric fluid a day (starts drinking her fluid early in the morning). Patient has also been working on 3 meals a day - tries to get in 3 meals a day, sometimes it's only 2 meals - does use a protein shake as a meal replacement or snack each day. Physical Activity/Exercise    Reviewed Activity Log  Discussed Perceived Compliance  Reasonable Goals Set  Motivation  Comments: patient purchased a stationary bike and is using it 5 days a week for 2,000 petals a day (15+ minutes)     Behavior Modification    Reviewed behavior modification log  Identify obstacles to trigger change  Achieving/Rewarding goals met  Positive attitude  Discussed perceived compliance  Comments:     Goals:  1. Continue consistently eating three meals a day, using a protein shake as a meal replacement or snack   2. Continue current exercise routine of using your stationary bike daily for 2,000 pedals each time   3. Continue drinking 64 ounces of non-caloric fluid a day, starting your fluid early in the morning  4. Continue your current daily vitamins.        Candidate for surgery (per RD):Pending     Dietitian: Marin Braxton

## 2018-09-05 NOTE — PATIENT INSTRUCTIONS
Goals: 1. Continue consistently eating three meals a day, using a protein shake as a meal replacement or snack   2. Continue current exercise routine of using your stationary bike daily for 2,000 pedals each time   3. Continue drinking 64 ounces of non-caloric fluid a day, starting your fluid early in the morning  4. Continue your current daily vitamins.

## 2018-09-17 ENCOUNTER — HOSPITAL ENCOUNTER (OUTPATIENT)
Age: 68
Setting detail: OUTPATIENT SURGERY
Discharge: HOME | End: 2018-09-17
Attending: INTERNAL MEDICINE | Admitting: INTERNAL MEDICINE
Payer: MEDICARE

## 2018-09-17 VITALS
WEIGHT: 293 LBS | SYSTOLIC BLOOD PRESSURE: 138 MMHG | HEIGHT: 67 IN | OXYGEN SATURATION: 99 % | HEART RATE: 62 BPM | RESPIRATION RATE: 16 BRPM | TEMPERATURE: 98.1 F | DIASTOLIC BLOOD PRESSURE: 53 MMHG | BODY MASS INDEX: 45.99 KG/M2

## 2018-09-17 DIAGNOSIS — Z01.818 PRE-OP EVALUATION: ICD-10-CM

## 2018-09-17 DIAGNOSIS — R94.39 ABNORMAL STRESS TEST: ICD-10-CM

## 2018-09-17 LAB
ANION GAP SERPL CALC-SCNC: 5 MMOL/L (ref 3–18)
BUN SERPL-MCNC: 19 MG/DL (ref 7–18)
BUN/CREAT SERPL: 23 (ref 12–20)
CALCIUM SERPL-MCNC: 9.6 MG/DL (ref 8.5–10.1)
CHLORIDE SERPL-SCNC: 103 MMOL/L (ref 100–108)
CHOLEST SERPL-MCNC: 173 MG/DL
CO2 SERPL-SCNC: 33 MMOL/L (ref 21–32)
CRD SYSTOLIC BP: 135
CREAT SERPL-MCNC: 0.82 MG/DL (ref 0.6–1.3)
END DIASTOLIC PRESSURE: 19
ERYTHROCYTE [DISTWIDTH] IN BLOOD BY AUTOMATED COUNT: 16 % (ref 11.6–14.5)
GLUCOSE SERPL-MCNC: 163 MG/DL (ref 74–99)
HCT VFR BLD AUTO: 35.4 % (ref 35–45)
HDLC SERPL-MCNC: 79 MG/DL (ref 40–60)
HDLC SERPL: 2.2 {RATIO} (ref 0–5)
HGB BLD-MCNC: 11.1 G/DL (ref 12–16)
INR PPP: 0.9 (ref 0.8–1.2)
LDLC SERPL CALC-MCNC: 78.4 MG/DL (ref 0–100)
LIPID PROFILE,FLP: ABNORMAL
MAGNESIUM SERPL-MCNC: 1.6 MG/DL (ref 1.6–2.6)
MCH RBC QN AUTO: 26.6 PG (ref 24–34)
MCHC RBC AUTO-ENTMCNC: 31.4 G/DL (ref 31–37)
MCV RBC AUTO: 84.9 FL (ref 74–97)
PLATELET # BLD AUTO: 281 K/UL (ref 135–420)
PMV BLD AUTO: 12.4 FL (ref 9.2–11.8)
POTASSIUM SERPL-SCNC: 3.9 MMOL/L (ref 3.5–5.5)
PROTHROMBIN TIME: 11.9 SEC (ref 11.5–15.2)
RBC # BLD AUTO: 4.17 M/UL (ref 4.2–5.3)
SODIUM SERPL-SCNC: 141 MMOL/L (ref 136–145)
TRIGL SERPL-MCNC: 78 MG/DL (ref ?–150)
VLDLC SERPL CALC-MCNC: 15.6 MG/DL
WBC # BLD AUTO: 6.2 K/UL (ref 4.6–13.2)

## 2018-09-17 PROCEDURE — C1894 INTRO/SHEATH, NON-LASER: HCPCS | Performed by: INTERNAL MEDICINE

## 2018-09-17 PROCEDURE — 99153 MOD SED SAME PHYS/QHP EA: CPT | Performed by: INTERNAL MEDICINE

## 2018-09-17 PROCEDURE — C1769 GUIDE WIRE: HCPCS | Performed by: INTERNAL MEDICINE

## 2018-09-17 PROCEDURE — 74011250636 HC RX REV CODE- 250/636

## 2018-09-17 PROCEDURE — 99152 MOD SED SAME PHYS/QHP 5/>YRS: CPT | Performed by: INTERNAL MEDICINE

## 2018-09-17 PROCEDURE — 74011250636 HC RX REV CODE- 250/636: Performed by: INTERNAL MEDICINE

## 2018-09-17 PROCEDURE — 83735 ASSAY OF MAGNESIUM: CPT | Performed by: INTERNAL MEDICINE

## 2018-09-17 PROCEDURE — 93460 R&L HRT ART/VENTRICLE ANGIO: CPT | Performed by: INTERNAL MEDICINE

## 2018-09-17 PROCEDURE — 77030016699 HC CATH ANGI DX INFN1 CARD -A: Performed by: INTERNAL MEDICINE

## 2018-09-17 PROCEDURE — 74011636320 HC RX REV CODE- 636/320: Performed by: INTERNAL MEDICINE

## 2018-09-17 PROCEDURE — 80048 BASIC METABOLIC PNL TOTAL CA: CPT | Performed by: INTERNAL MEDICINE

## 2018-09-17 PROCEDURE — 74011000250 HC RX REV CODE- 250: Performed by: INTERNAL MEDICINE

## 2018-09-17 PROCEDURE — 77030004521 HC CATH ANGI DX COOK -B: Performed by: INTERNAL MEDICINE

## 2018-09-17 PROCEDURE — 77030029997 HC DEV COM RDL R BND TELE -B: Performed by: INTERNAL MEDICINE

## 2018-09-17 PROCEDURE — 80061 LIPID PANEL: CPT | Performed by: INTERNAL MEDICINE

## 2018-09-17 PROCEDURE — C1751 CATH, INF, PER/CENT/MIDLINE: HCPCS | Performed by: INTERNAL MEDICINE

## 2018-09-17 PROCEDURE — 77030008543 HC TBNG MON PRSS MRTM -A: Performed by: INTERNAL MEDICINE

## 2018-09-17 PROCEDURE — 74011250637 HC RX REV CODE- 250/637: Performed by: INTERNAL MEDICINE

## 2018-09-17 PROCEDURE — 77030013797 HC KT TRNSDUC PRSSR EDWD -A: Performed by: INTERNAL MEDICINE

## 2018-09-17 PROCEDURE — 77030004522 HC CATH ANGI DX EXPO BSC -A: Performed by: INTERNAL MEDICINE

## 2018-09-17 PROCEDURE — 85027 COMPLETE CBC AUTOMATED: CPT | Performed by: INTERNAL MEDICINE

## 2018-09-17 PROCEDURE — 85610 PROTHROMBIN TIME: CPT | Performed by: INTERNAL MEDICINE

## 2018-09-17 RX ORDER — FENTANYL CITRATE 50 UG/ML
INJECTION, SOLUTION INTRAMUSCULAR; INTRAVENOUS AS NEEDED
Status: DISCONTINUED | OUTPATIENT
Start: 2018-09-17 | End: 2018-09-17 | Stop reason: HOSPADM

## 2018-09-17 RX ORDER — SODIUM CHLORIDE 0.9 % (FLUSH) 0.9 %
5-10 SYRINGE (ML) INJECTION AS NEEDED
Status: DISCONTINUED | OUTPATIENT
Start: 2018-09-17 | End: 2018-09-17 | Stop reason: HOSPADM

## 2018-09-17 RX ORDER — MIDAZOLAM HYDROCHLORIDE 1 MG/ML
INJECTION, SOLUTION INTRAMUSCULAR; INTRAVENOUS AS NEEDED
Status: DISCONTINUED | OUTPATIENT
Start: 2018-09-17 | End: 2018-09-17 | Stop reason: HOSPADM

## 2018-09-17 RX ORDER — SODIUM CHLORIDE 9 MG/ML
INJECTION, SOLUTION INTRAVENOUS
Status: DISCONTINUED | OUTPATIENT
Start: 2018-09-17 | End: 2018-09-17 | Stop reason: HOSPADM

## 2018-09-17 RX ORDER — HEPARIN SODIUM 1000 [USP'U]/ML
INJECTION, SOLUTION INTRAVENOUS; SUBCUTANEOUS AS NEEDED
Status: DISCONTINUED | OUTPATIENT
Start: 2018-09-17 | End: 2018-09-17 | Stop reason: HOSPADM

## 2018-09-17 RX ORDER — SODIUM CHLORIDE 0.9 % (FLUSH) 0.9 %
5-10 SYRINGE (ML) INJECTION EVERY 8 HOURS
Status: DISCONTINUED | OUTPATIENT
Start: 2018-09-17 | End: 2018-09-17 | Stop reason: HOSPADM

## 2018-09-17 RX ORDER — GUAIFENESIN 100 MG/5ML
81 LIQUID (ML) ORAL ONCE
Status: COMPLETED | OUTPATIENT
Start: 2018-09-17 | End: 2018-09-17

## 2018-09-17 RX ORDER — VERAPAMIL HYDROCHLORIDE 2.5 MG/ML
INJECTION, SOLUTION INTRAVENOUS AS NEEDED
Status: DISCONTINUED | OUTPATIENT
Start: 2018-09-17 | End: 2018-09-17 | Stop reason: HOSPADM

## 2018-09-17 RX ORDER — SODIUM CHLORIDE 9 MG/ML
50 INJECTION, SOLUTION INTRAVENOUS CONTINUOUS
Status: DISCONTINUED | OUTPATIENT
Start: 2018-09-17 | End: 2018-09-17 | Stop reason: HOSPADM

## 2018-09-17 RX ORDER — LORAZEPAM 1 MG/1
.5-1 TABLET ORAL ONCE
Status: COMPLETED | OUTPATIENT
Start: 2018-09-17 | End: 2018-09-17

## 2018-09-17 RX ORDER — HEPARIN SODIUM 200 [USP'U]/100ML
INJECTION, SOLUTION INTRAVENOUS
Status: DISCONTINUED | OUTPATIENT
Start: 2018-09-17 | End: 2018-09-17 | Stop reason: HOSPADM

## 2018-09-17 RX ADMIN — LORAZEPAM 1 MG: 1 TABLET ORAL at 08:16

## 2018-09-17 RX ADMIN — SODIUM CHLORIDE 50 ML/HR: 900 INJECTION, SOLUTION INTRAVENOUS at 08:14

## 2018-09-17 RX ADMIN — ASPIRIN 81 MG 81 MG: 81 TABLET ORAL at 08:15

## 2018-09-17 NOTE — Clinical Note
TRANSFER - IN REPORT:  
 
Verbal report received from: Jayleen Hurst RN. Report consisted of patient's Situation, Background, Assessment and  
Recommendations(SBAR). Opportunity for questions and clarification was provided. Assessment completed upon patient's arrival to unit and care assumed. Patient transported with a Cardiac Cath Tech / Patient Care Tech.

## 2018-09-17 NOTE — DISCHARGE INSTRUCTIONS
Cardiac Catheterization/Angiography Discharge Instructions    *Check the puncture site frequently for swelling or bleeding. If you see any bleeding, lie down and apply pressure over the area with a clean town or washcloth. Notify your doctor for any redness, swelling, drainage or oozing from the puncture site. Notify your doctor for any fever or chills. *Activity should be limited for the next 48 hours. Climb stairs as little as possible and avoid any stooping, bending or strenuous activity for 48 hours. No heavy lifting (anything over 10 pounds) for three days. *Do not drive for 48 hours. *You may resume your usual diet. Drink more fluids than usual.    *Have a responsible person drive you home and stay with you for at least 24 hours after your heart catheterization/angiography. *You may remove the bandage from your Right, Groin and Arm in 24 hours. You may shower in 24 hours. No tub baths, hot tubs or swimming for one week. Do not place any lotions, creams, powders, ointments over the puncture site for one week. You may place a clean band-aid over the puncture site each day for 5 days. Change this daily. DISCHARGE SUMMARY from Nurse    PATIENT INSTRUCTIONS:    After general anesthesia or intravenous sedation, for 24 hours or while taking prescription Narcotics:  · Limit your activities  · Do not drive and operate hazardous machinery  · Do not make important personal or business decisions  · Do  not drink alcoholic beverages  · If you have not urinated within 8 hours after discharge, please contact your surgeon on call.     Report the following to your surgeon:  · Excessive pain, swelling, redness or odor of or around the surgical area  · Temperature over 100.5  · Nausea and vomiting lasting longer than 4 hours or if unable to take medications  · Any signs of decreased circulation or nerve impairment to extremity: change in color, persistent  numbness, tingling, coldness or increase pain  · Any questions    What to do at Home:    *  Please give a list of your current medications to your Primary Care Provider. *  Please update this list whenever your medications are discontinued, doses are      changed, or new medications (including over-the-counter products) are added. *  Please carry medication information at all times in case of emergency situations. These are general instructions for a healthy lifestyle:    No smoking/ No tobacco products/ Avoid exposure to second hand smoke  Surgeon General's Warning:  Quitting smoking now greatly reduces serious risk to your health. Obesity, smoking, and sedentary lifestyle greatly increases your risk for illness    A healthy diet, regular physical exercise & weight monitoring are important for maintaining a healthy lifestyle    You may be retaining fluid if you have a history of heart failure or if you experience any of the following symptoms:  Weight gain of 3 pounds or more overnight or 5 pounds in a week, increased swelling in our hands or feet or shortness of breath while lying flat in bed. Please call your doctor as soon as you notice any of these symptoms; do not wait until your next office visit. Recognize signs and symptoms of STROKE:    F-face looks uneven    A-arms unable to move or move unevenly    S-speech slurred or non-existent    T-time-call 911 as soon as signs and symptoms begin-DO NOT go       Back to bed or wait to see if you get better-TIME IS BRAIN. Warning Signs of HEART ATTACK     Call 911 if you have these symptoms:   Chest discomfort. Most heart attacks involve discomfort in the center of the chest that lasts more than a few minutes, or that goes away and comes back. It can feel like uncomfortable pressure, squeezing, fullness, or pain.  Discomfort in other areas of the upper body. Symptoms can include pain or discomfort in one or both arms, the back, neck, jaw, or stomach.    Shortness of breath with or without chest discomfort.  Other signs may include breaking out in a cold sweat, nausea, or lightheadedness. Don't wait more than five minutes to call 911 - MINUTES MATTER! Fast action can save your life. Calling 911 is almost always the fastest way to get lifesaving treatment. Emergency Medical Services staff can begin treatment when they arrive -- up to an hour sooner than if someone gets to the hospital by car. The discharge information has been reviewed with the patient and caregiver. The patient and caregiver verbalized understanding. Patient armband removed and shredded    Discharge medications reviewed with the patient and caregiver and appropriate educational materials and side effects teaching were provided.   ___________________________________________________________________________________________________________________________________

## 2018-09-17 NOTE — IP AVS SNAPSHOT
[address] [address] 
[phone] Patient: Zuhair Germain MRN: [id number] HILLARY:[birth year] About your hospitalization You were admitted on:  September 17, 2018 You last received care in the:  [address] You were discharged on:  September 17, 2018 Why you were hospitalized Your primary diagnosis was:  Not on File Follow-up Information Follow up With Details Comments Contact Info Bridgette Liu NP   [address] 
[phone] Your Scheduled Appointments Wednesday October 03, 2018  7:30 AM EDT NUTRITION COUNSELING with TSS NUTR VISIT2 [facility] ([facility])  
 [address] [place] [address] [address]  
[phone] Discharge Orders None A check claudia indicates which time of day the medication should be taken. My Medications CONTINUE taking these medications Instructions Each Dose to Equal  
 Morning Noon Evening Bedtime  
 aspirin delayed-release 81 mg tablet Your last dose was: Your next dose is: Take  by mouth daily. COREG 25 mg tablet Generic drug:  carvedilol Your last dose was: Your next dose is: Take 25 mg by mouth two (2) times daily (with meals). 25 mg JANUMET 50-1,000 mg per tablet Generic drug:  SITagliptin-metFORMIN Your last dose was: Your next dose is: Take 1 Tab by mouth two (2) times daily (with meals). 1 Tab LANTUS SOLOSTAR U-100 INSULIN 100 unit/mL (3 mL) Inpn Generic drug:  insulin glargine Your last dose was: Your next dose is:    
   
   
 40 Units by SubCUTAneous route daily. 40 Units LIPITOR 20 mg tablet Generic drug:  atorvastatin Your last dose was: Your next dose is: Take  by mouth daily. MICARDIS HCT 80-25 mg per tablet Generic drug:  telmisartan-hydroCHLOROthiazide Your last dose was: Your next dose is: Take 1 Tab by mouth daily. 1 Tab MULTI VITAMIN PO Your last dose was: Your next dose is: Take 1 Tab by mouth daily. 1 Tab Discharge Instructions Cardiac Catheterization/Angiography Discharge Instructions *Check the puncture site frequently for swelling or bleeding. If you see any bleeding, lie down and apply pressure over the area with a clean town or washcloth. Notify your doctor for any redness, swelling, drainage or oozing from the puncture site. Notify your doctor for any fever or chills. *Activity should be limited for the next 48 hours. Climb stairs as little as possible and avoid any stooping, bending or strenuous activity for 48 hours. No heavy lifting (anything over 10 pounds) for three days. *Do not drive for 48 hours. *You may resume your usual diet. Drink more fluids than usual. 
 
*Have a responsible person drive you home and stay with you for at least 24 hours after your heart catheterization/angiography. *You may remove the bandage from your Right, Groin and Arm in 24 hours. You may shower in 24 hours. No tub baths, hot tubs or swimming for one week. Do not place any lotions, creams, powders, ointments over the puncture site for one week. You may place a clean band-aid over the puncture site each day for 5 days. Change this daily. DISCHARGE SUMMARY from Nurse PATIENT INSTRUCTIONS: 
 
After general anesthesia or intravenous sedation, for 24 hours or while taking prescription Narcotics: · Limit your activities · Do not drive and operate hazardous machinery · Do not make important personal or business decisions · Do  not drink alcoholic beverages · If you have not urinated within 8 hours after discharge, please contact your surgeon on call. Report the following to your surgeon: 
· Excessive pain, swelling, redness or odor of or around the surgical area · Temperature over 100.5 · Nausea and vomiting lasting longer than 4 hours or if unable to take medications · Any signs of decreased circulation or nerve impairment to extremity: change in color, persistent  numbness, tingling, coldness or increase pain · Any questions What to do at Home: *  Please give a list of your current medications to your Primary Care Provider. *  Please update this list whenever your medications are discontinued, doses are 
    changed, or new medications (including over-the-counter products) are added. *  Please carry medication information at all times in case of emergency situations. These are general instructions for a healthy lifestyle: No smoking/ No tobacco products/ Avoid exposure to second hand smoke Surgeon General's Warning:  Quitting smoking now greatly reduces serious risk to your health. Obesity, smoking, and sedentary lifestyle greatly increases your risk for illness A healthy diet, regular physical exercise & weight monitoring are important for maintaining a healthy lifestyle You may be retaining fluid if you have a history of heart failure or if you experience any of the following symptoms:  Weight gain of 3 pounds or more overnight or 5 pounds in a week, increased swelling in our hands or feet or shortness of breath while lying flat in bed. Please call your doctor as soon as you notice any of these symptoms; do not wait until your next office visit. Recognize signs and symptoms of STROKE: 
 
F-face looks uneven A-arms unable to move or move unevenly S-speech slurred or non-existent T-time-call 911 as soon as signs and symptoms begin-DO NOT go  
 Back to bed or wait to see if you get better-TIME IS BRAIN. Warning Signs of HEART ATTACK Call 911 if you have these symptoms: 
? Chest discomfort. Most heart attacks involve discomfort in the center of the chest that lasts more than a few minutes, or that goes away and comes back. It can feel like uncomfortable pressure, squeezing, fullness, or pain. ? Discomfort in other areas of the upper body. Symptoms can include pain or discomfort in one or both arms, the back, neck, jaw, or stomach. ? Shortness of breath with or without chest discomfort. ? Other signs may include breaking out in a cold sweat, nausea, or lightheadedness. Don't wait more than five minutes to call [address]! Fast action can save your life. Calling 911 is almost always the fastest way to get lifesaving treatment. Emergency Medical Services staff can begin treatment when they arrive  up to an hour sooner than if someone gets to the hospital by car. The discharge information has been reviewed with the patient and caregiver. The patient and caregiver verbalized understanding. Patient armband removed and shredded Discharge medications reviewed with the patient and caregiver and appropriate educational materials and side effects teaching were provided. ___________________________________________________________________________________________________________________________________ Introducing [facility]! [facility] introduces [employer] patient portal. Now you can access parts of your medical record, email your doctor's office, and request medication refills online. 1. In your internet browser, go to [email or web address] 2. Click on the First Time User? Click Here link in the Sign In box. You will see the New Member Sign Up page. 3. Enter your [employer] Access Code exactly as it appears below. You will not need to use this code after youve completed the sign-up process.  If you do not sign up before the expiration date, you must request a new code. · [employer] Access Code: [email or web address] Expires: 9/30/2018  8:08 AM 
 
4. Enter the last four digits of your Social Security Number (xxxx) and Date of Birth (mm/dd/yyyy) as indicated and click Submit. You will be taken to the next sign-up page. 5. Create a Accord ID. This will be your Accord login ID and cannot be changed, so think of one that is secure and easy to remember. 6. Create a Accord password. You can change your password at any time. 7. Enter your Password Reset Question and Answer. This can be used at a later time if you forget your password. 8. Enter your e-mail address. You will receive e-mail notification when new information is available in [address] 9. Click Sign Up. You can now view and download portions of your medical record. 10. Click the Download Summary menu link to download a portable copy of your medical information. If you have questions, please visit the Frequently Asked Questions section of the [employer] website. Remember, Accord is NOT to be used for urgent needs. For medical emergencies, dial 911. Now available from your iPhone and Android! Introducing Bernard Rosenthal As a [employer] patient, I wanted to make you aware of our electronic visit tool called Bernard Rosenthal. [employer] 24/7 allows you to connect within minutes with a medical provider 24 hours a day, seven days a week via a mobile device or tablet or logging into a secure website from your computer. You can access Bernard Rosenthal from anywhere in the [place].  
 
A virtual visit might be right for you when you have a simple condition and feel like you just dont want to get out of bed, or cant get away from work for an appointment, when your regular [employer] provider is not available (evenings, weekends or holidays), or when youre out of town and need minor care. Electronic visits cost only $49 and if the [employer] 24/7 provider determines a prescription is needed to treat your condition, one can be electronically transmitted to a nearby pharmacy*. Please take a moment to enroll today if you have not already done so. The enrollment process is free and takes just a few minutes. To enroll, please download the [employer] 24/7 elicia to your tablet or phone, or visit [email or web address] to enroll on your computer. And, as an [address] [place] [address] patient with a [employer] account, the results of your visits will be scanned into your electronic medical record and your primary care provider will be able to view the scanned results. We urge you to continue to see your regular [employer] provider for your ongoing medical care. And while your primary care provider may not be the one available when you seek a [employer] virtual visit, the peace of mind you get from getting a real diagnosis real time can be priceless. For more information on [employer], view our Frequently Asked Questions (FAQs) at [email or web address]. Sincerely, 
 
Terrell Valderrama MD 
Chief Medical Officer [address] *:  certain medications cannot be prescribed via [employer] Providers Seen During Your Hospitalization Provider Specialty Primary office phone Jayne Duran MD Cardiology [phone] Your Primary Care Physician (PCP) Primary Care Physician Office Phone Office Fax Josefina Storey, [address] [phone] You are allergic to the following Allergen Reactions Poison Ivy Extract Swelling Robitussin (Guaifenesin) Hives Swelling Recent Documentation Height Weight BMI Smoking Status 1.702 m (!) 160.7 kg 55.48 kg/m2 Former Smoker Emergency Contacts Name Discharge Info Relation Home Work Mobile Juan Kowalski CAREGIVER [3] Daughter [21]   [phone] FranklinYordan ANGEL DISCHARGE CAREGIVER [3] Spouse [3] [phone] Patient Belongings The following personal items are in your possession at time of discharge: 
  Dental Appliances: None  Visual Aid: Glasses      Home Medications: None   Jewelry: None  Clothing: At bedside    Other Valuables: None Please provide this summary of care documentation to your next provider. Signatures-by signing, you are acknowledging that this After Visit Summary has been reviewed with you and you have received a copy. Patient Signature:  ____________________________________________________________ Date:  ____________________________________________________________  
  
[facility] Provider Signature:  ____________________________________________________________ Date:  ____________________________________________________________

## 2018-09-17 NOTE — PROGRESS NOTES
Prepped & ready for procedure. 1047 pt back from cardiac cath. TR band intact to the Right wrist. Right femoral sheath in, dressing intact. Pt denies sob or pain. 1200 Right femoral sheath pulled by KALIN Cosby, pressure held x 5 minutes and dressing applied. No bleeding or swelling noted. Tolerated well. 1230 TR band removed. No bleeding or swelling noted. 1300 pt needs to go to bathroom, unable to use bedpan. Dr. Chelsi Cabrera called made aware, order to allow pt to go to bathroom and then back to bed, stay one more hour to monitor her. Right groin dressing intact no swelling or bleeding noted. Pt ambulated to bathroom and back to bed. No bleeding or swelling noted on right groin or right arm. Pt denies sob or pain. 1405 discharged instructions reviewed with patient, verbalized understanding. Pt discharged home in care of daughter in stable condition via w/c. Hugh Crane Pt denies sob or pain. Right radial dressing intact no bleeding or swelling. Right groin dressing dry & intact with no bleeding or swelling.

## 2018-09-17 NOTE — Clinical Note
TRANSFER - OUT REPORT:  
 
Verbal report given to: Daniella Calix RN. Report consisted of patient's Situation, Background, Assessment and  
Recommendations(SBAR). Opportunity for questions and clarification was provided. Patient transported with a Registered Nurse and [address] [place] [address] / [facility] Tech. Patient transported to: Care Unit.

## 2018-09-17 NOTE — PROGRESS NOTES
Cardiology Progress Note Patient: Juan Luis Zuniga        Sex: female          DOA: 9/17/2018 YOB: 1950      Age:  70 y.o.        LOS:  LOS: 0 days Assessment/Plan Date of Surgery Update: 
Juan Luis Zuniga was seen and examined. History and physical has been reviewed. The patient has been examined. There have been no significant clinical changes since the completion of the originally dated History and Physical. 
 
Signed By: Vicci Phoenix, MD   
 September 17, 2018 9:32 AM   
  
 
 
Signed By: Vicci Phoenix, MD   
 September 17, 2018

## 2018-09-17 NOTE — ROUTINE PROCESS
Cardiac Cath Lab:  Pre Procedure Chart Check Patients chart was accessed and reviewed for possible and/or scheduled procedure. Creatinine Clearance: CREATININE: 0.82 MG/DL (09/17/18 0739) Estimated creatinine clearance: 103.4 mL/min Total Contrast  Load: 
3 x estimated clearance amount=  310.2ml 
 
75% of Contrast Load: 0.75 x Total Contrast Load=    232.65ml Recent Labs  
   09/17/18 
 1253 WBC  6.2  
RBC  4.17* HCT  35.4 HGB  11.1*  
PLT  281 INR  0.9 PTP  11.9 NA  141  
K  3.9 BUN  19* CREA  0.82 GFRAA  >60  
GFRNA  >60  
CA  9.6 BMI: Body mass index is 55.48 kg/(m^2). ALLERGIES:  
Allergies Allergen Reactions  Poison Ivy Extract Swelling  Robitussin [Guaifenesin] Hives and Swelling Lines: 
  
  
Peripheral IV 09/17/18 Left Forearm (Active) Site Assessment Clean, dry, & intact 9/17/2018  8:12 AM  
Phlebitis Assessment 0 9/17/2018  8:12 AM  
Infiltration Assessment 0 9/17/2018  8:12 AM  
Dressing Status Clean, dry, & intact 9/17/2018  8:12 AM  
Dressing Type Transparent;Tape 9/17/2018  8:12 AM  
Hub Color/Line Status Pink 9/17/2018  8:12 AM  
Action Taken Open ports on tubing capped 9/17/2018  8:12 AM  
Alcohol Cap Used Yes 9/17/2018  8:12 AM  
 
  
 
History: 
 
Past Medical History:  
Diagnosis Date  Arthritis   
 osteoarthitis  Diabetes type 2, controlled ([address]  GERD (gastroesophageal reflux disease)  HTN (hypertension)  Hypercholesterolemia  Ill-defined condition   
 bilat cataract sugeries  Morbid obesity ([address]  Morbid obesity with BMI of 50.0-59.9, adult ([address]  Osteoarthritis of both knees Past Surgical History:  
Procedure Laterality Date  HX BREAST BIOPSY    
 on right breast  
 HX COLONOSCOPY    
 HX LAP CHOLECYSTECTOMY    
 2006  HX TUBAL LIGATION    
 HX WISDOM TEETH EXTRACTION Patient Active Problem List  
Diagnosis Code  Morbid obesity with body mass index (BMI) of 50.0 to 59.9 in adult ([facility] E66.01, Z68.43  
 Morbid obesity due to excess calories ([facility] E66.01  
 Diabetes type 2, controlled ([facility] E11.9  Hypercholesterolemia E78.00  
 HTN (hypertension) I10  
 Morbid obesity (HCC) E66.01  
 Morbid obesity with BMI of 50.0-59.9, adult ([facility]) E66.01, Z68.43  
 Osteoarthritis of both knees M17.0

## 2018-10-03 ENCOUNTER — CLINICAL SUPPORT (OUTPATIENT)
Dept: SURGERY | Age: 68
End: 2018-10-03

## 2018-10-03 VITALS — BODY MASS INDEX: 45.99 KG/M2 | WEIGHT: 293 LBS | HEIGHT: 67 IN

## 2018-10-03 DIAGNOSIS — E66.01 MORBID OBESITY WITH BODY MASS INDEX (BMI) OF 50.0 TO 59.9 IN ADULT (HCC): Primary | ICD-10-CM

## 2018-10-03 NOTE — PROGRESS NOTES
Medical Weight Loss Multi-Disciplinary Program    Name: Clifford Ayala   : 1950    Session# 4  Date: 10/3/2018     Height: 5' 7\" (170.2 cm)    Weight: 157.9 kg (348 lb) lbs. Body mass index is 54.5 kg/(m^2). Pounds Lost: 6     Dietary Instructions    Reviewed intake  Instruction given for personal dietary changes  Discussed perceived compliance  Comments: reviewed patient's past monthly diet hx. Patient has been working on consistently eating three meals a day - still having trouble eating lunch - usually eats her first meal is around 10-11:00 and isn't hungry around lunch and eats dinner around 6 pm.  Knows she has to work on eating her lunch (using a protein shake). Also states she is getting her 64 ounces of fluid a day. Physical Activity/Exercise    Reviewed Activity Log  Discussed Perceived Compliance  Reasonable Goals Set  Motivation  Comments: patient is using her stationary bike 5-6 days a week for 5,000 pedals a day - goal is 10,000 pedals a day     Behavior Modification    Reviewed behavior modification log  Identify obstacles to trigger change  Achieving/Rewarding goals met  Positive attitude  Discussed perceived compliance  Comments:     Goals:  1. Continue working on consistently eating three meals a day - no skipping lunch - using your protein shake as a meal replacement or snack  2. Continue getting 64 ounces of non-caloric fluid a day   3.  Continue using your stationary bike 5-6 days a week for 5,000 pedals with the overall goal of 10,000 pedals a day and using your 3 pound weight throughout the day       Candidate for surgery (per RD): yes    Dietitian: Jackson Hunter

## 2018-10-03 NOTE — PATIENT INSTRUCTIONS
Goals: 1. Continue working on consistently eating three meals a day - no skipping lunch - using your protein shake as a meal replacement or snack  2. Continue getting 64 ounces of non-caloric fluid a day   3.  Continue using your stationary bike 5-6 days a week for 5,000 pedals with the overall goal of 10,000 pedals a day and using your 3 pound weight throughout the day

## 2019-06-10 ENCOUNTER — OFFICE VISIT (OUTPATIENT)
Dept: SURGERY | Age: 69
End: 2019-06-10

## 2019-06-10 VITALS
HEART RATE: 76 BPM | TEMPERATURE: 98 F | HEIGHT: 67 IN | WEIGHT: 293 LBS | OXYGEN SATURATION: 100 % | SYSTOLIC BLOOD PRESSURE: 130 MMHG | BODY MASS INDEX: 45.99 KG/M2 | DIASTOLIC BLOOD PRESSURE: 79 MMHG | RESPIRATION RATE: 16 BRPM

## 2019-06-10 DIAGNOSIS — E11.9 CONTROLLED TYPE 2 DIABETES MELLITUS WITHOUT COMPLICATION, WITH LONG-TERM CURRENT USE OF INSULIN (HCC): ICD-10-CM

## 2019-06-10 DIAGNOSIS — E66.01 MORBID OBESITY (HCC): Primary | ICD-10-CM

## 2019-06-10 DIAGNOSIS — E66.01 MORBID OBESITY WITH BODY MASS INDEX (BMI) OF 50.0 TO 59.9 IN ADULT (HCC): ICD-10-CM

## 2019-06-10 DIAGNOSIS — Z87.891 SMOKING HISTORY: ICD-10-CM

## 2019-06-10 DIAGNOSIS — Z79.4 CONTROLLED TYPE 2 DIABETES MELLITUS WITHOUT COMPLICATION, WITH LONG-TERM CURRENT USE OF INSULIN (HCC): ICD-10-CM

## 2019-06-10 DIAGNOSIS — E78.00 HYPERCHOLESTEROLEMIA: ICD-10-CM

## 2019-06-10 DIAGNOSIS — I10 ESSENTIAL HYPERTENSION: ICD-10-CM

## 2019-06-10 DIAGNOSIS — M17.0 PRIMARY OSTEOARTHRITIS OF BOTH KNEES: ICD-10-CM

## 2019-06-10 RX ORDER — OMEPRAZOLE 20 MG/1
20 CAPSULE, DELAYED RELEASE ORAL
COMMUNITY

## 2019-06-10 RX ORDER — HYDROCHLOROTHIAZIDE 25 MG/1
TABLET ORAL
COMMUNITY
Start: 2019-06-04

## 2019-06-10 NOTE — PROGRESS NOTES
Bariatric Surgery Consultation    Subjective: The patient is a 70 y.o. obese female with a Body mass index is 55.88 kg/m². .  The patient is currently her heaviest weight for the past several years. she has been overweight since as long as she can rememver. she has been considering surgery since last year. she desires surgery at this time because of multiple health concerns and their lifestyle issues which are hindered by their weight. she has been referred by his family physician for evaluation and treatment of their obesity via surgical intervention. Zuhair Germain has tried multiple diets in her lifetime most recently tried physician supervised, behavior modification and unsupervised diets     The patient had her initial consult in July of 2018 and completed essentially all of her criteria however her  became ill in the fall of 2018 and subsequently passed away in Jan of this year. She now wishes to proceed with gastric bypass in an effort to manage her DM. She is hoping not to have to repeat any of her criteria. Bariatric comorbidities present are   Patient Active Problem List   Diagnosis Code    Morbid obesity with body mass index (BMI) of 50.0 to 59.9 in adult ([facility] E66.01, Z68.43    Morbid obesity due to excess calories (HCC) E66.01    Diabetes type 2, controlled ([facility] E11.9    Hypercholesterolemia E78.00    HTN (hypertension) I10    Morbid obesity ([facility] E66.01    Morbid obesity with BMI of 50.0-59.9, adult ([facility] E66.01, Z68.43    Osteoarthritis of both knees M17.0    Hiatal hernia K44.9    Vertigo R42    Smoking history Z87.891       The patient is considering the sleeve gastrectomy for surgical weight loss due to their ineffective progress with medical forms of weight loss and the urging of their physician who cares for their primary medical issues.  The patient  now presents  for consideration for weight loss surgery understanding the benefits of this over a medical approach of weight loss as was discussed in our presentation on weight loss surgery. They have discussed their plans both with their family and primary care physician who is in support of their pursuit of such. The patient has had no health issues as of late and denies and gastrointestinal disturbances other than what is outlined below in their review of symptoms. All of their prior evaluations available by both their PCP's and specialists physicians have been reviewed today either in the Care Everywhere portal or scanned under the media tab. I have spent a large portion of my initial consultation today reviewing the patients current dietary habits which have contributed to their health issues and obesity. I have suggested to them personally a dietary regimen that they can initiate now to help with their status as it pertains to their weight. They understand that the most important aspect of their journey through their weight loss endeavor will be their adherence to a new lifestyle of healthy eating behavior. They also understand that an adherence to an exercise program will not only help with weight loss but is ultimately important in weight maintenance. The patients goal weight is 172 lb.        Patient Active Problem List    Diagnosis Date Noted    Hiatal hernia     Vertigo     Smoking history     Morbid obesity with body mass index (BMI) of 50.0 to 59.9 in adult [facility]) 07/06/2018    Morbid obesity due to excess calories ([address] 07/06/2018    Diabetes type 2, controlled ([address]     Hypercholesterolemia     HTN (hypertension)     Morbid obesity ([address]     Morbid obesity with BMI of 50.0-59.9, adult ([address]     Osteoarthritis of both knees       Past Surgical History:   Procedure Laterality Date    HX BREAST BIOPSY      on right breast    HX COLONOSCOPY      HX LAP CHOLECYSTECTOMY      2006    HX TUBAL LIGATION      HX WISDOM TEETH EXTRACTION        Social History     Tobacco Use    Smoking status: Former Smoker     Packs/day: 0.25     Years: 8.00     Pack years: 2.00     Types: Cigarettes     Last attempt to quit: 1975     Years since quittin.9    Smokeless tobacco: Never Used   Substance Use Topics    Alcohol use: No      Family History   Problem Relation Age of Onset    Hypertension Mother     Emphysema Father     Asthma Father       Current Outpatient Medications   Medication Sig Dispense Refill    hydroCHLOROthiazide (HYDRODIURIL) 25 mg tablet       omeprazole (PRILOSEC) 20 mg capsule Take 20 mg by mouth.  SITagliptin-metFORMIN (JANUMET) 50-1,000 mg per tablet Take 1 Tab by mouth two (2) times daily (with meals).  carvedilol (COREG) 25 mg tablet Take 25 mg by mouth two (2) times daily (with meals).  insulin glargine (LANTUS SOLOSTAR U-100 INSULIN) 100 unit/mL (3 mL) inpn 40 Units by SubCUTAneous route daily.  aspirin delayed-release 81 mg tablet Take  by mouth daily.  telmisartan-hydroCHLOROthiazide (MICARDIS HCT) 80-25 mg per tablet Take 1 Tab by mouth daily.  atorvastatin (LIPITOR) 20 mg tablet Take  by mouth daily. Allergies   Allergen Reactions    Poison Ivy Extract Swelling    Robitussin [Guaifenesin] Hives and Swelling        Review of Systems:        General - No history or complaints of unexpected fever, chills, or weight loss  Head/Neck - No history or complaints of headache, diplopia, dysphagia, hearing loss  Cardiac - History of heart murmur.   No history or complaints of chest pain, palpitations, or shortness of breath  Pulmonary - No history or complaints of shortness of breath, productive cough, hemoptysis  Gastrointestinal - History of acid reflux noted which is resolved, no abdominal pain, obstipation/constipation or blood per rectum  Genitourinary - No history or complaints of hematuria/dysuria, stress urinary incontinence symptoms, or renal lithiasis  Musculoskeletal - History of joint pain in their bilateral knees,  no muscular weakness  Hematologic - No history or complaints of bleeding disorders,  No blood transfusions  Neurologic - No history or complaints of  migraine headaches, seizure activity, syncopal episodes, TIA or stroke  Integumentary - History of rash.   No history or complaints of abnormal nevi, skin cancer  Gynecological - No history of cervical cancer or dysmenorrhea    Objective:     Visit Vitals  /79 (BP 1 Location: Left arm, BP Patient Position: Sitting)   Pulse 76   Temp 98 °F (36.7 °C)   Resp 16   Ht 5' 7\" (1.702 m)   Wt (!) 161.8 kg (356 lb 12.8 oz)   SpO2 100%   BMI 55.88 kg/m²       Physical Examination: General appearance - alert, well appearing, and in no distress  Mental status - alert, oriented to person, place, and time  Eyes - pupils equal and reactive, extraocular eye movements intact  Ears - bilateral TM's and external ear canals normal  Nose - normal and patent, no erythema, discharge or polyps  Mouth - mucous membranes moist, pharynx normal without lesions  Neck - supple, no significant adenopathy  Lymphatics - no palpable lymphadenopathy, no hepatosplenomegaly  Chest - clear to auscultation, no wheezes, rales or rhonchi, symmetric air entry  Heart - normal rate, regular rhythm, normal S1, S2, no murmurs, rubs, clicks or gallops  Abdomen - soft, nontender, nondistended, no masses or organomegaly  Back exam - full range of motion, no tenderness, palpable spasm or pain on motion  Neurological - alert, oriented, normal speech, no focal findings or movement disorder noted  Musculoskeletal - no joint tenderness, deformity or swelling  Extremities - peripheral pulses normal, no pedal edema, no clubbing or cyanosis  Skin - normal coloration and turgor, no rashes, no suspicious skin lesions noted    Labs:     Lab Results   Component Value Date/Time    WBC 6.2 09/17/2018 07:39 AM    HGB 11.1 (L) 09/17/2018 07:39 AM    HCT 35.4 09/17/2018 07:39 AM    PLATELET 329 20/35/7374 07:39 AM    MCV 84.9 09/17/2018 07:39 AM     Lab Results   Component Value Date/Time    Sodium 141 09/17/2018 07:39 AM    Potassium 3.9 09/17/2018 07:39 AM    Chloride 103 09/17/2018 07:39 AM    CO2 33 (H) 09/17/2018 07:39 AM    Anion gap 5 09/17/2018 07:39 AM    Glucose 163 (H) 09/17/2018 07:39 AM    BUN 19 (H) 09/17/2018 07:39 AM    Creatinine 0.82 09/17/2018 07:39 AM    BUN/Creatinine ratio 23 (H) 09/17/2018 07:39 AM    GFR est AA >60 09/17/2018 07:39 AM    GFR est non-AA >60 09/17/2018 07:39 AM    Calcium 9.6 09/17/2018 07:39 AM     No results found for: IRON, FE, TIBC, IBCT, PSAT, FERR  No results found for: FOL, RBCF  No results found for: VITD3, XQVID2, XQVID3, XQVID, VD3RIA      Assessment:     Morbid obesity with associated comorbidity    Plan:     Laparoscopic sleeve gastrectomy    This is a  69 yo female with a BMI of 55.8 and the weight-related co-morbidties as noted below. She meets the NIH criteria for bariatric surgery based upon the BMI of 55.8 and multiple weight-related co-morbidties. She has elected laparoscopic sleeve gastrectomy as her intervention of choice for treatment of morbid obestiy through surgical means secondary to its safety profile, rapid return to work  and decreases in operative risks over gastric bypass. In the office today, following a history and physical examination, a 30 minute discussion regarding the anatomic alterations for the laparoscopic sleeve gastrectomy was undertaken. The dietary expectations and the patient and physician dependent factors for success were thoroughly discussed, to include the need for interval follow-up and long-term dietary changes associated with success. The possible complications of the sleeve gastrectomy  were also discussed, to include;death, DVT/PE, staple line leak, bleeding, stricture formation, infection, nutritional deficiencies and sleeve dilation.   Specific weight related outcomes for success were also discussed with an emphasis on careful and close follow-up with the first year and eating behavior modification as the baseline and cyclical hunger return. The patient expressed an understanding of the above factors, and her questions were answered in their entirety. In addition, the patient attended a 1.5 hour power point seminar regarding obesity, surgical weight loss including, adjustable gastric band, gastric bypass, and sleeve gastrectomy. This discussion contrasted the different surgical techniques, mechanisms of actions and expected outcomes, and surgical and medical risks associated with each procedure. During this seminar, there was a long question and answer session where each questions was answered until there were no additional questions. Today, the patient had all of her questions answered and desires to proceed with  bariatric surgery initially choosing sleeve gastrectomy as her surgical option. She was seen by cardiology in Sept 2018 for a cardiac cath with the following conclusion;     1- Patent coronary arteries. Minimal ostial luminal irregularity in ostial OM1. 2- LVEDP 19 mmHg. 3- No significant pulmonary hypertension- PVR is 1.41 woods unit. Patient is clear for surgery and she is a acceptable risk for bariatric surgery. Discussed with patient and daughter. Continue with risk factors management. Office staff is reviewing her chart and will determine her best course moving forward. Secondary Diagnoses:     Dietary Intervention  - The patient is currently scheduled to see or has been followed by a bariatric nutritionist for an attempt at preoperative weight loss as has been dictated by their insurance carrier. They will be assessed at various times during their follow up to evaluate their progress depending on the length of time that is required once again by their carrier.   I have explained the importance of preoperative weight loss and the benefits regarding lower surgical risk and also assisting the patient in reaching their weight loss goal.  Finally they understand their is a physiologic benefit from the standpoint of hepatic volume reduction preoperatively. I have reiterated the importance of a low carbohydrate and high protein regimen to achieve their stated goal.     GERD -The patient understands that weight loss surgery is not a guaranteed cure for reflux disease but does understand the benefits that weight loss can have on reflux disease. They also understand that at the time of surgery the gastroesophageal junction will be evaluated for the presence of a diaphragmatic hernia. Hernias will be corrected always with the gastric band and sleeve gastrectomy procedures, but only on a case by case basis with the gastric bypass if it prevents our ability to perform the operation at hand, or if I feel that they would benefit long term with correction of this issue. The patient also understands that neither weight loss surgery nor repair of a diaphragmatic hernia repair guarantees the complete cessation of the disease. They also understand there is a possibility of recurrence with a simple crural repair as is performed with these procedures. They understand they may have to continue their medications in the postoperative period. They have a good understanding that the gastric bypass procedure is better suited to total resolution of this issue and that neither the Lap Band nor sleeve gastrectomy is considered a curative procedure as it pertains to this diagnosis. Adult Onset Diabetes - The patient has amrita given a very low carbohydrate diet preoperatively along with instructions to monitor their blood sugars on a regular daily basis.  When  their surgery is performed  we will be monitoring the patient with sliding scale insulin and accuchecks.  Based on those values we will determine whether the patient needs a reduction of those medications postoperatively or total removal of those medications on discharge.  We will have the patient continue accuchecks postoperatively while at home also and report to me or their family physician for appropriate adjustments as needed.  The patient also understands that in the event of uncontrolled blood sugar preoperatively that we may choose to postpone their surgery.     Hypertension - The patient has a clear understanding of how weight loss improves hypertension as a whole, but also they understand that there is a significant genetic component to this disease process. We will monitor the patients blood pressure while in the hospital and the plan would be to continue those medications postoperatively.  If a diuretic is being used we will stop them on discharge to prevent dehydration particularly with the sleeve gastrectomy and the gastric bypass procedures.  They will be instructed to monitor their blood pressure postoperatively while at home and notify their primary care physician in the event of any significantly high or uncharacteristic readings.     Hyperlipidemia - The patient understands that studies show that almost all patient will realize an improvement in their lipid profile with weight loss that occurs with these procedures. They however also understand that hyperlipidemia is a multifactorial disease particularly as it pertains to their genetic background and that there is no guarantee toward cure  of this issue.  We will resume their medications immediately postoperatively as this tends to decrease any post operative cardiac events.  The patient will follow up with their family physician in the postoperative period with plans to repeat their lipid panel 2-3 month postoperative for potential adjustment or removal of these medications.     Weight Related Arthritis -The patient understands the benefits that weight loss surgery can have on their arthritis but also understands that weight loss is not a guaranteed cure and relief of symptoms is often dependent on the severity of the underlying disease.  The patient also understands that traditional pharmaceutical treatments for this diagnosis are usually unavailable to post-operative weight loss patients due to the effects on the gastrointestinal tract particularly with the gastric bypass and to a lesser effect with the sleeve gastrectomy.  Any changes to the patients medication treatment will ultimately be made the patients PCP with input by our office.     Signed By: Mary Kay Sanon MD     Penny 10, 2019

## 2019-06-12 ENCOUNTER — DOCUMENTATION ONLY (OUTPATIENT)
Dept: SURGERY | Age: 69
End: 2019-06-12

## 2019-06-12 ENCOUNTER — TELEPHONE (OUTPATIENT)
Dept: SURGERY | Age: 69
End: 2019-06-12

## 2019-06-12 NOTE — PROGRESS NOTES
Dr. Scott Snyder please advise:    Patient was seen by you on 6/10/19. She is requesting a referral to Cardiology per your conversation. Her last referral  2018.       Thank you

## 2019-06-17 DIAGNOSIS — E66.01 MORBID OBESITY (HCC): Primary | ICD-10-CM

## 2019-06-19 ENCOUNTER — CLINICAL SUPPORT (OUTPATIENT)
Dept: SURGERY | Age: 69
End: 2019-06-19

## 2019-06-19 VITALS — BODY MASS INDEX: 45.99 KG/M2 | HEIGHT: 67 IN | WEIGHT: 293 LBS

## 2019-06-19 DIAGNOSIS — E66.01 MORBID OBESITY WITH BODY MASS INDEX (BMI) OF 50.0 TO 59.9 IN ADULT (HCC): Primary | ICD-10-CM

## 2019-06-19 NOTE — PROGRESS NOTES
Medical Weight Loss Multi-Disciplinary Program    Name: Ryann Gilbert   : 1950    Session# 5  Date: 2019     Height: 5' 7\" (170.2 cm)    Weight: (!) 162.8 kg (359 lb) lbs. Body mass index is 56.23 kg/m². Pounds Gained:3    Dietary Instructions    Reviewed intake  Understanding label reading  Understanding low carbohydrates, low sugar, higher protein meals  Understanding proper portions  Dining outside home  Instruction given for personal dietary changes  Discussed perceived compliance  Comments: reviewed patient's past monthly diet hx. Patient had to take a 6+ month break due to her husbands health and then his death. She's been eating 3 meals. Patient stopped using her protein shake, she uses them occasionally (Banana premier protein shake). She's been working to get around 40 ounces of fluid a day. Breakfast: whole wheat cereal with fat free milk, 1/2 piece of toast and a HB eggs  Morning Snack: none  Beverage: water    Lunch: tuna and crackers, sometimes fruit  Afternoon Snack: none   Beverage: water    Dinner: baked chicken or baked fish, sometimes tuna, turkey [place], tossed salad, fruit  Evening Snack: none  Beverage: water, crystal light     Physical Activity/Exercise    Reviewed Activity Log  Discussed Perceived Compliance  Reasonable Goals Set  Motivation  Comments: patient uses her exercise bike daily for 3,000 steps (bike goes up to 10,000 steps a day) - she's building herself back up to her old average of 5,000 steps a day. Behavior Modification    Reviewed behavior modification log  Identify obstacles to trigger change  Achieving/Rewarding goals met  Positive attitude  Discussed perceived compliance  Comments:     Goals:  1. Continue to work to eat three meals a day focusing on protein and non-starchy vegetables. Continue to get back into the routine of using your protein shake once a day as a meal replacement or snack.     2. Carbohydrates: work to keep your carbohydrates to 50 grams or less per day - working to measure and track your carbohydrates using your carbohydrate measurement/tracking handout. 3. Work to increase your non-caloric fluid from 40 ounces a day to 64 ounces a day - non-caloric fluid is anything that's sugar free, diet and decaf     4.  Continue working to use your stationary bike daily for 3,000 steps building back up to 10,000 steps a day in the future     Candidate for surgery (per RD): YES    Dietitian: Marin Braxton

## 2019-06-19 NOTE — PATIENT INSTRUCTIONS
Goals: 1. Continue to work to eat three meals a day focusing on protein and non-starchy vegetables. Continue to get back into the routine of using your protein shake once a day as a meal replacement or snack. 2. Carbohydrates: work to keep your carbohydrates to 50 grams or less per day - working to measure and track your carbohydrates using your carbohydrate measurement/tracking handout. 3. Work to increase your non-caloric fluid from 40 ounces a day to 64 ounces a day - non-caloric fluid is anything that's sugar free, diet and decaf     4. Continue working to use your stationary bike daily for 3,000 steps building back up to 10,000 steps a day in the future Using your bike throughout the day to get to the total of 10,000 steps each day.

## 2020-10-06 ENCOUNTER — HOSPITAL ENCOUNTER (OUTPATIENT)
Dept: WOUND CARE | Age: 70
Discharge: HOME | End: 2020-10-06
Attending: PODIATRIST
Payer: MEDICARE

## 2020-10-06 PROCEDURE — 11042 DBRDMT SUBQ TIS 1ST 20SQCM/<: CPT

## 2020-10-06 NOTE — WOUND CARE
a 
[facility] Progress Note and Procedure Note Madelin Reid MEDICAL RECORD NUMBER:  [id number] AGE: 69 y.o. RACE BLACK OR   GENDER: female  : 1950 EPISODE DATE:  10/6/2020 Subjective:  
 
No chief complaint on file. HISTORY of PRESENT ILLNESS HPI 
 
[email or web address] Renard Springer is a 69 y.o. female who presents today for wound/ulcer evaluation. History of Wound Context: chronic leg stasis edema and recurrent ulceration also diabetic obesity Wound/Ulcer Pain Timing/Severity: mild Quality of pain: aching Severity:  3 / 10 Modifying Factors: None Associated Signs/Symptoms: edema Ulcer Identification: 
Ulcer Type: venous Contributing Factors: edema, venous stasis, diabetes and obesity Wound: right medial stasis ulceration non infected PAST MEDICAL HISTORY Past Medical History:  
Diagnosis Date  Diabetes type 2, controlled ([address] Dx  / started insulin   GERD (gastroesophageal reflux disease)  Hiatal hernia   
 seen on UGI  HTN (hypertension)  Hypercholesterolemia  Morbid obesity ([address]  Morbid obesity with BMI of 50.0-59.9, adult ([address]  Osteoarthritis of both knees  Smoking history   
 quit   Vertigo PAST SURGICAL HISTORY Past Surgical History:  
Procedure Laterality Date  HX BREAST BIOPSY    
 on right breast  
 HX COLONOSCOPY    
 HX LAP CHOLECYSTECTOMY    
   HX TUBAL LIGATION    
 HX WISDOM TEETH EXTRACTION    
 
 
FAMILY HISTORY Family History Problem Relation Age of Onset  Hypertension Mother  Emphysema Father  Asthma Father SOCIAL HISTORY Social History Tobacco Use  Smoking status: Former Smoker Packs/day: 0.25 Years: 8.00 Pack years: 2.00 Types: Cigarettes Last attempt to quit: 1975 Years since quittin.2  Smokeless tobacco: Never Used Substance Use Topics  Alcohol use: No  
 Drug use: No  
 
 
ALLERGIES Allergies Allergen Reactions  Poison Ivy Extract Swelling  Robitussin [Guaifenesin] Hives and Swelling MEDICATIONS Current Outpatient Medications on File Prior to Encounter Medication Sig Dispense Refill  hydroCHLOROthiazide (HYDRODIURIL) 25 mg tablet  omeprazole (PRILOSEC) 20 mg capsule Take 20 mg by mouth.  SITagliptin-metFORMIN (JANUMET) 50-1,000 mg per tablet Take 1 Tab by mouth two (2) times daily (with meals).  carvedilol (COREG) 25 mg tablet Take 25 mg by mouth two (2) times daily (with meals).  insulin glargine (LANTUS SOLOSTAR U-100 INSULIN) 100 unit/mL (3 mL) inpn 40 Units by SubCUTAneous route daily.  aspirin delayed-release 81 mg tablet Take  by mouth daily.  telmisartan-hydroCHLOROthiazide (MICARDIS HCT) 80-25 mg per tablet Take 1 Tab by mouth daily.  atorvastatin (LIPITOR) 20 mg tablet Take  by mouth daily. No current facility-administered medications on file prior to encounter. REVIEW OF SYSTEMS Pertinent items are noted in HPI. Objective: There were no vitals taken for this visit. Wt Readings from Last 3 Encounters:  
06/19/19 (!) 162.8 kg (359 lb)  
06/10/19 (!) 161.8 kg (356 lb 12.8 oz)  
10/03/18 157.9 kg (348 lb) PHYSICAL EXAM 
 
Pertinent items are noted in HPI. Assessment:  
  
Problem List Items Addressed This Visit None POP IN PROCEDURE TYPE (DEBRIDEMENT, BIOPSY, I&D, SKIN SUB, CHEMICAL CAUERTY) Plan:  
 
Treatment Note please see attached Discharge Instructions Written patient dismissal instructions given to patient or POA. Electronically signed by Pancho Krueger DPM on 10/6/2020 at 5:09 PM 
 
 
 
 
 
 
Debridement Wound Care Problem List Items Addressed This Visit None Procedure Note Indications:  Based on my examination of this patient's wound(s)/ulcer(s) today, debridement is required to promote healing and evaluate the wound base. Performed by: Ronaldo Gaytan DPM 
 
Consent obtained: Yes Time out taken: Yes Debridement: Excisional 
 
Using curette the wound(s)/ulcer(s) was/were sharply debrided down through and including the removal of   
epidermis, dermis and subcutaneous tissue Devitalized Tissue Debrided: fibrin, biofilm, slough and exudate Pre Debridement Measurements: 
Are located in the [place]  Documentation Flow Sheet Wound/Ulcer #: 1 Post Debridement Measurements: 
Wound/Ulcer Descriptions are Pre Debridement except measurements:Non-Pressure ulcer, fat layer exposed Percent of Wound(s)/Ulcer(s) Debrided: 100% Total Surface Area Debrided:  *** sq cm Diabetic/Pressure/Non Pressure Ulcers only: 
Ulcer:  
 
Estimated Blood Loss:  Minimal  
 
Hemostasis Achieved: Pressure Procedural Pain: 1 / 10 Post Procedural Pain: 0 / 10 Response to treatment: Well tolerated by patient

## 2020-10-07 VITALS
OXYGEN SATURATION: 99 % | SYSTOLIC BLOOD PRESSURE: 155 MMHG | DIASTOLIC BLOOD PRESSURE: 53 MMHG | HEART RATE: 88 BPM | TEMPERATURE: 98 F | RESPIRATION RATE: 18 BRPM

## 2020-10-07 NOTE — DISCHARGE INSTRUCTIONS
Discharge Instructions for  [address]  [address] [address], [address] [facility]  [phone] Fax [phone]    NAME:  Jens Eckert  YOB: 1950  MEDICAL RECORD NUMBER:  [id number]  DATE:  10/6/2020    Wound Cleansing:   Do not scrub or use excessive force. Cleanse wound prior to applying a clean dressing with:  [] Normal Saline [] Keep Wound Dry in Shower    [] Wound Cleanser   [] Cleanse wound with Mild Soap & Water  [] May Shower at Discharge   [] Other:      Topical Treatments:  Do not apply lotions, creams, or ointments to wound bed unless directed. [] Apply moisturizing lotion to skin surrounding the wound prior to dressing change.  [] Apply antifungal ointment to skin surrounding the wound prior to dressing change.  [] Apply thin film of moisture barrier ointment to skin immediately around wound. [] Other:       Dressings:           Wound Location RLE   [] Apply Primary Dressing:       [] MediHoney Gel [] Alginate with Silver [] Alginate   [] Collagen [] Collagen with Silver   [] Santyl with Moisten saline gauze     [] Hydrocolloid   [] MediHoney Alginate [] Foam with Silver   [] Foam   [] Hydrofera Blue    [] Mepilex Border    [] Moisten with Saline [] Hydrogel [] Mepitel     [] Bactroban/Mupirocin [] Polysporin  [] Other:    [] Pack wound loosely with  [] Iodoform   [] Plain Packing  [] Other   [] Cover and Secure with:     [] Gauze [] Yahir [] Kerlix   [] Ace Wrap [] Cover Roll Tape [] ABD     [] Other:    Avoid contact of tape with skin.   [] Change dressing: [] Daily    [] Every Other Day [] Three times per week   [] Once a week [x] Do Not Change Dressing   [] Other:     Edema Control:  Apply: [x] Compression Stocking []Right Leg [x]Left Leg   [] Tubigrip []Right Leg Double Layer []Left Leg Double Layer       []Right Leg Single Layer []Left Leg Single Layer   [] SpandaGrip []Right Leg  []Left Leg      []Low compression 5-10 mm/Hg      []Medium compression 10-20 mm/Hg     []High compression  20-30 mm/Hg   every morning immediately when getting up should be applied to affected leg(s) from mid foot to knee making sure to cover the heel. Remove every night before going to bed. [] Elevate leg(s) above the level of the heart when sitting. [] Avoid prolonged standing in one place. [] Elevate arm/hand above the level of the heart []RightArm []LeftArm         Return Appointment:  [] Wound and dressing supply provider:   [] ECF or Home Healthcare:  [] Wound Assessment: [] Physician or NP scheduled for Wound Assessment:   [x] Return Appointment: With MD  in  [facility])  [] Ordered tests:      Electronically signed Vanda Alford RN on 10/7/2020 at 11:49 AM     [address] [facility] [address] Information: Should you experience any significant changes in your wound(s) or have questions about your wound care, please contact the [facility] Main at [address] [place] [address] 8:00 am - 4:30. If you need help with your wound outside these hours and cannot wait until we are again available, contact your PCP or go to the hospital emergency room. PLEASE NOTE: IF YOU ARE UNABLE TO OBTAIN WOUND SUPPLIES, CONTINUE TO USE THE SUPPLIES YOU HAVE AVAILABLE UNTIL YOU ARE ABLE TO REACH US. IT IS MOST IMPORTANT TO KEEP THE WOUND COVERED AT ALL TIMES.

## 2020-10-07 NOTE — WOUND CARE
[employer] Company:  
 
[employer] [id number] [address] p: [phone] f: [phone] [employer] lymphedema pumps Ordering Center: [facility] OP WOUND [facility] 
[address] [id number] [phone] WOUND CARE [unfilled] FAX NUMBER [phone] Patient Information:  
  
Terra Hatchet [address] [place] [address]  
@[email or web address]@ : 1950 AGE: 69 y.o. GENDER: female TODAYS DATE:  10/7/2020 Insurance: PRIMARY INSURANCE: 
[id number] - (Medicare) Payor/Plan Subscr  Sex Relation Sub. Ins. ID Effective Group Num 1. DAILY (MEDICAntoine Viri 1950 Female  [id number] [address], MAIL CODE [id number] 2. [phone] [facility] * 1950 Female  [id number] [address] Patient Wound Information:  
  
Problem List Items Addressed This Visit None WOUNDS REQUIRING DRESSING SUPPLIES:  
 
Wound Leg lower Right;Medial (Active) Dressing Type Open to air 10/06/20 1600 Wound Length (cm) 2.5 cm 10/06/20 1600 Wound Width (cm) 3.5 cm 10/06/20 1600 Wound Depth (cm) 0.1 cm 10/06/20 1600 Wound Surface Area (cm^2) 8.75 cm^2 10/06/20 1600 Wound Volume (cm^3) 0.88 cm^3 10/06/20 1600 Condition of [facility] 10/06/20 1600 Condition of Edges Open 10/06/20 1600 Drainage Amount Moderate 10/06/20 1600 Drainage Color Serosanguinous 10/06/20 1600 Wound Odor None 10/06/20 1600 Jennifer-wound Assessment Yellow-brown 10/06/20 1600 Margins Attached edges 10/06/20 1600 Cleansing and Cleansing Agents  Dermal wound cleanser 10/06/20 1600 Dressing Changed Changed/New 10/06/20 1600 Dressing Type Applied Alginate; Unna boot 10/06/20 1600 Wound Procedure Type Debridement- Surgical 10/06/20 1600 Consent Obtained  Yes 10/06/20 9044 Post-Procedure Length (cm) 2.6 cm 10/06/20 1600 Post-Procedure Width (cm) 3.6 cm 10/06/20 1600 Post-Procedure Depth (cm) 0.1 cm 10/06/20 1600 Post-Procedure Volume (cm^3) 0.94 cm^3 10/06/20 1600 Post-Procedure Surface Area (cm^2) 9.36 cm^2 10/06/20 1600 Procedure Tolerated Well 10/06/20 1600 Number of days: 1 Right Leg Measurements: (ALL measurements are in cm) 34.5, 51.5, 42 Left Leg Measurements: (ALL measurements are in cm) Supplies Requested :  
 
Medicare Requirements Patient must have a qualifying Active Venus Ulcer if ordering Bilateral Compression Wounds MUST be present on both legs for Medicare Coverage. The patient can Not be on home health or have had a Medicare part A stay in the past 24 hours. Patient Wound(s) Debrided: [x] Yes if yes please add date 10/6/2020   [] No 
 
Debribement Type: Excisional/Sharp Patient currently being seen by Home Health: [] Yes   [x] No  
 
Compression Type: Other farrow wrap 4000 Provider Information:  
  
PROVIDER'S NAME: Pilar Guerrero NPI: [id number]

## 2020-10-07 NOTE — WOUND CARE
10/06/20 1600 Wound Leg lower Right;Medial  
Date First Assessed/Time First Assessed: 10/06/20 1600   Present on Hospital Admission: Yes  Primary Wound Type: Venous  Location: Leg lower  Wound Location Orientation: Right;Medial  
Dressing Type Open to air Wound Length (cm) 2.5 cm Wound Width (cm) 3.5 cm Wound Depth (cm) 0.1 cm Wound Surface Area (cm^2) 8.75 cm^2 Wound Volume (cm^3) 0.88 cm^3 Condition of [facility] Condition of Edges Open Drainage Amount Moderate Drainage Color Serosanguinous Wound Odor None Jennifer-wound Assessment Yellow-brown Margins Attached edges Cleansing and Cleansing Agents  Dermal wound cleanser Dressing Changed Changed/New Dressing Type Applied Alginate; Unna boot Wound Procedure Type Debridement- Surgical  
Consent Obtained  Yes Post-Procedure Length (cm) 2.6 cm Post-Procedure Width (cm) 3.6 cm Post-Procedure Depth (cm) 0.1 cm Post-Procedure Volume (cm^3) 0.94 cm^3 Post-Procedure Surface Area (cm^2) 9.36 cm^2 Procedure Tolerated Well  
 
 
 
 10/07/20 1153 Right Leg Edema Point of Measurement Heel to calf 42 Leg circumference 51.5 cm Ankle circumference 34.5 cm Compression Therapy Unna boot Peripheral Vascular Peripheral Vascular (WDL) WDL Musculoskeletal  
Musculoskeletal (WDL) X Foot Assessment Foot Assessment X Toe Nail Assessment Toe Nail Assessment X Right Nail Assessment Improper Length and Hygiene Y 
(pt cut 3rd toe nail and bleeding occured, dry and loretta ) Racemi Below Knee NAME:  Alina Rahcna [facility] YOB: 1950 MEDICAL RECORD NUMBER:  [id number] DATE:  10/6/2020 Remove old Unna Boot or Boots. Applied moisturizing agent to dry skin as needed. Appied primary and secondary dressing as ordered. Applied Unna roll from toes to knee overlapping each time. Applied ace wrap or coban from toes to below the knee. Secured with tape and/or metal clips covered with tape. Instructed patient/caregiver to keep dressing dry and intact. DO NOT REMOVE DRESSING. Applied [employer] dressing below the knee to right lower leg. Unna Boot(s) were applied per  Guidelines.

## 2020-10-13 ENCOUNTER — HOSPITAL ENCOUNTER (OUTPATIENT)
Dept: WOUND CARE | Age: 70
Discharge: HOME | End: 2020-10-13
Attending: PODIATRIST
Payer: MEDICARE

## 2020-10-13 PROCEDURE — 11042 DBRDMT SUBQ TIS 1ST 20SQCM/<: CPT

## 2020-10-13 NOTE — WOUND CARE
Debridement Wound Care Problem List Items Addressed This Visit None Procedure Note Indications:  Based on my examination of this patient's wound(s)/ulcer(s) today, debridement is required to promote healing and evaluate the wound base. Performed by: Hong Henry DPM 
 
Consent obtained: Yes Time out taken: Yes Debridement: Excisional 
 
Using curette the wound(s)/ulcer(s) was/were sharply debrided down through and including the removal of   
epidermis, dermis and subcutaneous tissue Devitalized Tissue Debrided: fibrin, biofilm and slough Pre Debridement Measurements: 
Are located in the [place]  Documentation Flow Sheet Wound/Ulcer #: 1 Post Debridement Measurements: 
Wound/Ulcer Descriptions are Pre Debridement except measurements:Non-Pressure ulcer, fat layer exposed Wound Leg lower Right;Medial (Active) Dressing Type Open to air 10/06/20 1600 Wound Length (cm) 2.5 cm 10/06/20 1600 Wound Width (cm) 3.5 cm 10/06/20 1600 Wound Depth (cm) 0.1 cm 10/06/20 1600 Wound Surface Area (cm^2) 8.75 cm^2 10/06/20 1600 Wound Volume (cm^3) 0.88 cm^3 10/06/20 1600 Condition of [facility] 10/06/20 1600 Condition of Edges Open 10/06/20 1600 Drainage Amount Moderate 10/06/20 1600 Drainage Color Serosanguinous 10/06/20 1600 Wound Odor None 10/06/20 1600 Jennifer-wound Assessment Yellow-brown 10/06/20 1600 Margins Attached edges 10/06/20 1600 Cleansing and Cleansing Agents  Dermal wound cleanser 10/06/20 1600 Dressing Changed Changed/New 10/06/20 1600 Dressing Type Applied Alginate; Unna boot 10/06/20 1600 Wound Procedure Type Debridement- Surgical 10/06/20 1600 Consent Obtained  Yes 10/06/20 1600 Post-Procedure Length (cm) 2.6 cm 10/06/20 1600 Post-Procedure Width (cm) 3.6 cm 10/06/20 1600 Post-Procedure Depth (cm) 0.1 cm 10/06/20 1600 Post-Procedure Volume (cm^3) 0.94 cm^3 10/06/20 1600 Post-Procedure Surface Area (cm^2) 9.36 cm^2 10/06/20 1600 Procedure Tolerated Well 10/06/20 1600 Number of days: 7 Percent of Wound(s)/Ulcer(s) Debrided: 100% Total Surface Area Debrided:  9 sq cm Diabetic/Pressure/Non Pressure Ulcers only: 
Ulcer:  
 
Estimated Blood Loss:  Minimal  
 
Hemostasis Achieved: Pressure Procedural Pain: 2 / 10 Post Procedural Pain: 0 / 10 Response to treatment: Well tolerated by patient

## 2020-10-14 VITALS
SYSTOLIC BLOOD PRESSURE: 172 MMHG | DIASTOLIC BLOOD PRESSURE: 67 MMHG | RESPIRATION RATE: 18 BRPM | OXYGEN SATURATION: 96 % | TEMPERATURE: 98.9 F | HEART RATE: 72 BPM

## 2020-10-14 NOTE — WOUND CARE
10/13/20 1136 Wound Leg lower Right;Medial  
Date First Assessed/Time First Assessed: 10/06/20 1600   Present on Hospital Admission: Yes  Primary Wound Type: Venous  Location: Leg lower  Wound Location Orientation: Right;Medial  
Dressing Status Intact Dressing Type Compression Wrap/Venous Stasis Wound Length (cm) 2.5 cm Wound Width (cm) 2.5 cm Wound Depth (cm) 0.1 cm Wound Surface Area (cm^2) 6.25 cm^2 Wound Volume (cm^3) 0.62 cm^3 Change in Wound Size % 28.57 Condition of [facility] Condition of Edges Open Drainage Amount Moderate Drainage Color Serosanguinous Wound Odor None Jennifer-wound Assessment Yellow-brown Margins Attached edges Cleansing and Cleansing Agents  Dermal wound cleanser Dressing Changed Changed/New Dressing Type Applied Alginate; Unna boot Wound Procedure Type Debridement- Surgical  
Consent Obtained  Yes Post-Procedure Length (cm) 2.6 cm Post-Procedure Width (cm) 2.6 cm Post-Procedure Depth (cm) 0.1 cm Post-Procedure Volume (cm^3) 0.68 cm^3 Post-Procedure Surface Area (cm^2) 6.76 cm^2 Procedure Tolerated Well

## 2020-10-14 NOTE — DISCHARGE INSTRUCTIONS
Discharge Instructions for  [address]  [address] [place], [employer]  [phone] Fax [phone]    NAME:  Moe Cervantes  YOB: 1950  MEDICAL RECORD NUMBER:  [id number]  DATE:  10/13/2020    Wound Cleansing:   Do not scrub or use excessive force. Cleanse wound prior to applying a clean dressing with:  [] Normal Saline [x] Keep Wound Dry in Shower    [] Wound Cleanser   [] Cleanse wound with Mild Soap & Water  [x] May Shower at Discharge   [] Other:      Topical Treatments:  Do not apply lotions, creams, or ointments to wound bed unless directed. [] Apply moisturizing lotion to skin surrounding the wound prior to dressing change.  [] Apply antifungal ointment to skin surrounding the wound prior to dressing change.  [] Apply thin film of moisture barrier ointment to skin immediately around wound. [] Other:       Dressings:           Wound Location RLE   [] Apply Primary Dressing:       [] MediHoney Gel [] Alginate with Silver [] Alginate   [] Collagen [] Collagen with Silver   [] Santyl with Moisten saline gauze     [] Hydrocolloid   [] MediHoney Alginate [] Foam with Silver   [] Foam   [] Hydrofera Blue    [] Mepilex Border    [] Moisten with Saline [] Hydrogel [] Mepitel     [] Bactroban/Mupirocin [] Polysporin  [] Other:    [] Pack wound loosely with  [] Iodoform   [] Plain Packing  [] Other   [] Cover and Secure with:     [] Gauze [] Yahir [] Kerlix   [] Ace Wrap [] Cover Roll Tape [] ABD     [] Other:    Avoid contact of tape with skin.   [] Change dressing: [] Daily    [] Every Other Day [] Three times per week   [] Once a week [x] Do Not Change Dressing   [] Other:     Activity:  [x] Activity as tolerated:  [] Patient has no activity restrictions     [] Strict Bedrest: [] Remain off Work:     [] May return to full duty work:                                   [] Return to work with restrictions:             Return Appointment:  [] Wound and dressing supply provider:   [] ECF or Home Healthcare:  [] Wound Assessment: [] Physician or NP scheduled for Wound Assessment:   [x] Return Appointment: With MD  in  1 Week(s)  [] Ordered tests:     [address] Information: Should you experience any significant changes in your wound(s) or have questions about your wound care, please contact the [facility] Main at [address] [place] [address] 8:00 am - 4:30. If you need help with your wound outside these hours and cannot wait until we are again available, contact your PCP or go to the hospital emergency room. PLEASE NOTE: IF YOU ARE UNABLE TO OBTAIN WOUND SUPPLIES, CONTINUE TO USE THE SUPPLIES YOU HAVE AVAILABLE UNTIL YOU ARE ABLE TO REACH US. IT IS MOST IMPORTANT TO KEEP THE WOUND COVERED AT ALL TIMES.

## 2020-10-20 ENCOUNTER — HOSPITAL ENCOUNTER (OUTPATIENT)
Dept: WOUND CARE | Age: 70
Discharge: HOME | End: 2020-10-20
Attending: PODIATRIST
Payer: MEDICARE

## 2020-10-20 PROCEDURE — 99213 OFFICE O/P EST LOW 20 MIN: CPT

## 2020-10-20 NOTE — WOUND CARE
[address] Progress Note and Procedure Note NO Procedure Oralia Reid MEDICAL RECORD NUMBER:  [id number] AGE: 69 y.o. RACE BLACK OR   GENDER: female  : 1950 EPISODE DATE:  10/20/2020 Subjective:  
 
No chief complaint on file. HISTORY of PRESENT ILLNESS HPI 
 
[email or web address] Cici Tanner is a 69 y.o. female who presents today for wound/ulcer evaluation. Healed 100% History of Wound Context: stasis ulcer all healed Wound/Ulcer Pain Timing/Severity: none Quality of pain:  
Severity:   / 10 Modifying Factors:  
Associated Signs/Symptoms:  
 
Ulcer Identification: 
Ulcer Type: venous Contributing Factors: edema, venous stasis and lymphedema Wound: all healed 100% PAST MEDICAL HISTORY Past Medical History:  
Diagnosis Date  Diabetes type 2, controlled ([address] Dx  / started insulin   GERD (gastroesophageal reflux disease)  Hiatal hernia   
 seen on UGI  HTN (hypertension)  Hypercholesterolemia  Morbid obesity ([address]  Morbid obesity with BMI of 50.0-59.9, adult ([address]  Osteoarthritis of both knees  Smoking history   
 quit   Vertigo PAST SURGICAL HISTORY Past Surgical History:  
Procedure Laterality Date  HX BREAST BIOPSY    
 on right breast  
 HX COLONOSCOPY    
 HX LAP CHOLECYSTECTOMY    
 2006  HX TUBAL LIGATION    
 HX WISDOM TEETH EXTRACTION    
 
 
FAMILY HISTORY Family History Problem Relation Age of Onset  Hypertension Mother  Emphysema Father  Asthma Father SOCIAL HISTORY Social History Tobacco Use  Smoking status: Former Smoker Packs/day: 0.25 Years: 8.00 Pack years: 2.00 Types: Cigarettes Last attempt to quit: 1975 Years since quittin.3  Smokeless tobacco: Never Used Substance Use Topics  Alcohol use: No  
 Drug use: No  
 
 
ALLERGIES Allergies Allergen Reactions  Poison Ivy Extract Swelling  Robitussin [Guaifenesin] Hives and Swelling MEDICATIONS Current Outpatient Medications on File Prior to Encounter Medication Sig Dispense Refill  hydroCHLOROthiazide (HYDRODIURIL) 25 mg tablet  omeprazole (PRILOSEC) 20 mg capsule Take 20 mg by mouth.  SITagliptin-metFORMIN (JANUMET) 50-1,000 mg per tablet Take 1 Tab by mouth two (2) times daily (with meals).  carvedilol (COREG) 25 mg tablet Take 25 mg by mouth two (2) times daily (with meals).  insulin glargine (LANTUS SOLOSTAR U-100 INSULIN) 100 unit/mL (3 mL) inpn 40 Units by SubCUTAneous route daily.  aspirin delayed-release 81 mg tablet Take  by mouth daily.  telmisartan-hydroCHLOROthiazide (MICARDIS HCT) 80-25 mg per tablet Take 1 Tab by mouth daily.  atorvastatin (LIPITOR) 20 mg tablet Take  by mouth daily. No current facility-administered medications on file prior to encounter. REVIEW OF SYSTEMS Pertinent items are noted in HPI. Objective: There were no vitals taken for this visit. Wt Readings from Last 3 Encounters:  
06/19/19 (!) 162.8 kg (359 lb)  
06/10/19 (!) 161.8 kg (356 lb 12.8 oz)  
10/03/18 157.9 kg (348 lb) PHYSICAL EXAM 
 
Pertinent items are noted in HPI. Assessment:  
  
Problem List Items Addressed This Visit None Procedure Note Indications:  Based on my examination of this patient's wound(s)/ulcer(s) today, debridement is not required to promote healing and evaluate the wound base. Wound Leg lower Right;Medial (Active) Dressing Status Intact 10/13/20 1136 Dressing Type Compression Wrap/Venous Stasis 10/13/20 1136 Wound Length (cm) 2.5 cm 10/13/20 1136 Wound Width (cm) 2.5 cm 10/13/20 1136 Wound Depth (cm) 0.1 cm 10/13/20 1136 Wound Surface Area (cm^2) 6.25 cm^2 10/13/20 1136 Wound Volume (cm^3) 0.62 cm^3 10/13/20 1136 Change in Wound Size % 28.57 10/13/20 1136 Condition of [facility] 10/13/20 1136 Condition of Edges Open 10/13/20 1136 Drainage Amount Moderate 10/13/20 1136 Drainage Color Serosanguinous 10/13/20 1136 Wound Odor None 10/13/20 1136 Jennifer-wound Assessment Yellow-brown 10/13/20 1136 Margins Attached edges 10/13/20 1136 Cleansing and Cleansing Agents  Dermal wound cleanser 10/13/20 1136 Dressing Changed Changed/New 10/13/20 1136 Dressing Type Applied Alginate; Unna boot 10/13/20 1136 Wound Procedure Type Debridement- Surgical 10/13/20 1136 Consent Obtained  Yes 10/13/20 1136 Post-Procedure Length (cm) 2.6 cm 10/13/20 1136 Post-Procedure Width (cm) 2.6 cm 10/13/20 1136 Post-Procedure Depth (cm) 0.1 cm 10/13/20 1136 Post-Procedure Volume (cm^3) 0.68 cm^3 10/13/20 1136 Post-Procedure Surface Area (cm^2) 6.76 cm^2 10/13/20 1136 Procedure Tolerated Well 10/13/20 1136 Number of days: 14 Plan:  
 
Healed 100% Patient discharged  From care Treatment Note please see attached Discharge Instructions Written patient dismissal instructions given to patient or POA.       
 
Electronically signed by Get Collado DPM on 10/20/2020 at 1:30 PM

## 2020-10-28 VITALS
HEART RATE: 77 BPM | OXYGEN SATURATION: 97 % | SYSTOLIC BLOOD PRESSURE: 177 MMHG | DIASTOLIC BLOOD PRESSURE: 66 MMHG | RESPIRATION RATE: 18 BRPM | TEMPERATURE: 98.7 F

## 2020-10-28 NOTE — WOUND CARE
10/20/20 1702 [REMOVED] Wound Leg lower Right;Medial  
Final Assessment Date/Final Assessment Time: 10/20/20 1355  Date First Assessed/Time First Assessed: 10/06/20 1600   Present on Hospital Admission: Yes  Primary Wound Type: Venous  Location: Leg lower  Wound Location Orientation: Right;Medial  Wound O... Dressing Status Dry; Intact Dressing Type Compression Wrap/Venous Stasis Wound Length (cm)  
(clinically closed ) Drainage Amount None Wound Odor None Jennifer-wound Assessment Yellow-brown

## 2020-10-28 NOTE — DISCHARGE INSTRUCTIONS
Discharge Instructions for  [address]  [address] [place], [facility]  [phone] Fax [phone]    NAME:  Kevin Singh  YOB: 1950  MEDICAL RECORD NUMBER:  [id number]  DATE:  10/20/2020    Wound Cleansing:   Do not scrub or use excessive force. Cleanse wound prior to applying a clean dressing with:  [] Normal Saline [] Keep Wound Dry in Shower    [] Wound Cleanser   [] Cleanse wound with Mild Soap & Water  [x] May Shower at Discharge   [] Other:      Topical Treatments:  Do not apply lotions, creams, or ointments to wound bed unless directed. [] Apply moisturizing lotion to skin surrounding the wound prior to dressing change.  [] Apply antifungal ointment to skin surrounding the wound prior to dressing change.  [] Apply thin film of moisture barrier ointment to skin immediately around wound. [x] Other: moisturize legs nightly as needed         Edema Control:  Apply: [x] Compression Stocking [x]Right Leg [x]Left Leg   [] Tubigrip []Right Leg Double Layer []Left Leg Double Layer       []Right Leg Single Layer []Left Leg Single Layer   [] SpandaGrip []Right Leg  []Left Leg      []Low compression 5-10 mm/Hg      []Medium compression 10-20 mm/Hg     []High compression  20-30 mm/Hg   every morning immediately when getting up should be applied to affected leg(s) from mid foot to knee making sure to cover the heel. Remove every night before going to bed. [] Elevate leg(s) above the level of the heart when sitting. [] Avoid prolonged standing in one place.    [] Elevate arm/hand above the level of the heart []RightArm []LeftArm     Activity:  [x] Activity as tolerated:  [] Patient has no activity restrictions     [] Strict Bedrest: [] Remain off Work:     [] May return to full duty work:                                   [] Return to work with restrictions:             Return Appointment:  [] Wound and dressing supply provider:   [] ECF or Home Healthcare:  [] Wound Assessment: [] Physician or NP scheduled for Wound Assessment:   [x] Discharged from [facility]  [] Ordered tests:      Electronically signed April Kym Ruiz RN on 10/28/2020 at 5:04 PM     [address] Information: Should you experience any significant changes in your wound(s) or have questions about your wound care, please contact the [facility] at [address] [place] [address] 8:00 am - 4:30. If you need help with your wound outside these hours and cannot wait until we are again available, contact your PCP or go to the hospital emergency room. PLEASE NOTE: IF YOU ARE UNABLE TO OBTAIN WOUND SUPPLIES, CONTINUE TO USE THE SUPPLIES YOU HAVE AVAILABLE UNTIL YOU ARE ABLE TO REACH US. IT IS MOST IMPORTANT TO KEEP THE WOUND COVERED AT ALL TIMES.

## 2021-08-03 PROBLEM — E66.01 MORBID OBESITY DUE TO EXCESS CALORIES (HCC): Status: RESOLVED | Noted: 2018-07-06 | Resolved: 2021-08-03

## (undated) DEVICE — TUBING PRSS MON L24IN PVC RIG NONEXPANDING M TO FEM CONN

## (undated) DEVICE — TORCON NB, ADVANTAGE CATHETER: Brand: TORCON NB

## (undated) DEVICE — ANGIOGRAPHIC CATHETER: Brand: EXPO™

## (undated) DEVICE — PROCEDURE KIT FLUID MGMT 10 FR CUST MAINFOLD

## (undated) DEVICE — GUIDEWIRE VASC L260CM DIA0.035IN RAD 3MM J TIP L7CM PTFE

## (undated) DEVICE — STOPCOCK TRNSDUC 500PSI 3 W ROT M LUER LT BLU OFF HNDL R

## (undated) DEVICE — RADIFOCUS OBTURATOR: Brand: RADIFOCUS

## (undated) DEVICE — 3M™ TEGADERM™ TRANSPARENT FILM DRESSING FRAME STYLE, 1626W, 4 IN X 4-3/4 IN (10 CM X 12 CM), 50/CT 4CT/CASE: Brand: 3M™ TEGADERM™

## (undated) DEVICE — CATHETER ANGIO NTR 0.045 INX5 FRX100 CM THRULUMEN EXPO

## (undated) DEVICE — CATH DIAG FR4 EXPO 5FRX100CM -- EXPO

## (undated) DEVICE — PACK PROCEDURE SURG CATH CUST

## (undated) DEVICE — PRESSURE MONITORING SET: Brand: TRUWAVE

## (undated) DEVICE — SHIELD RAD 14X16 IN W/ SCOOP ABSORBER

## (undated) DEVICE — SWAN-GANZ POLYMER BLEND TRUE SIZE C-TIP CONTROLCATH TD CATHETER: Brand: SWAN-GANZ CONTROLCATH TRUE SIZE

## (undated) DEVICE — GLIDESHEATH SLENDER STAINLESS STEEL KIT: Brand: GLIDESHEATH SLENDER

## (undated) DEVICE — BAND RADIAL COMPR ARTERY 27CM -- LNG BX/10